# Patient Record
Sex: MALE | Race: ASIAN | NOT HISPANIC OR LATINO | URBAN - METROPOLITAN AREA
[De-identification: names, ages, dates, MRNs, and addresses within clinical notes are randomized per-mention and may not be internally consistent; named-entity substitution may affect disease eponyms.]

---

## 2021-08-31 ENCOUNTER — EMERGENCY (EMERGENCY)
Facility: HOSPITAL | Age: 41
LOS: 1 days | Discharge: HOME | End: 2021-08-31
Attending: EMERGENCY MEDICINE | Admitting: EMERGENCY MEDICINE
Payer: COMMERCIAL

## 2021-08-31 VITALS
HEART RATE: 82 BPM | OXYGEN SATURATION: 96 % | WEIGHT: 186.07 LBS | TEMPERATURE: 101 F | HEIGHT: 71 IN | DIASTOLIC BLOOD PRESSURE: 74 MMHG | SYSTOLIC BLOOD PRESSURE: 124 MMHG | RESPIRATION RATE: 16 BRPM

## 2021-08-31 VITALS
OXYGEN SATURATION: 99 % | TEMPERATURE: 99 F | SYSTOLIC BLOOD PRESSURE: 117 MMHG | HEART RATE: 72 BPM | DIASTOLIC BLOOD PRESSURE: 67 MMHG | RESPIRATION RATE: 18 BRPM

## 2021-08-31 PROCEDURE — 96374 THER/PROPH/DIAG INJ IV PUSH: CPT

## 2021-08-31 PROCEDURE — 99284 EMERGENCY DEPT VISIT MOD MDM: CPT | Mod: 25

## 2021-08-31 PROCEDURE — 96375 TX/PRO/DX INJ NEW DRUG ADDON: CPT

## 2021-08-31 PROCEDURE — 99284 EMERGENCY DEPT VISIT MOD MDM: CPT

## 2021-08-31 RX ORDER — FAMOTIDINE 10 MG/ML
20 INJECTION INTRAVENOUS ONCE
Refills: 0 | Status: COMPLETED | OUTPATIENT
Start: 2021-08-31 | End: 2021-08-31

## 2021-08-31 RX ORDER — ACETAMINOPHEN 500 MG
650 TABLET ORAL ONCE
Refills: 0 | Status: COMPLETED | OUTPATIENT
Start: 2021-08-31 | End: 2021-08-31

## 2021-08-31 RX ORDER — KETOROLAC TROMETHAMINE 30 MG/ML
30 SYRINGE (ML) INJECTION ONCE
Refills: 0 | Status: DISCONTINUED | OUTPATIENT
Start: 2021-08-31 | End: 2021-08-31

## 2021-08-31 RX ORDER — SODIUM CHLORIDE 9 MG/ML
1000 INJECTION INTRAMUSCULAR; INTRAVENOUS; SUBCUTANEOUS ONCE
Refills: 0 | Status: COMPLETED | OUTPATIENT
Start: 2021-08-31 | End: 2021-08-31

## 2021-08-31 RX ADMIN — SODIUM CHLORIDE 1000 MILLILITER(S): 9 INJECTION INTRAMUSCULAR; INTRAVENOUS; SUBCUTANEOUS at 22:31

## 2021-08-31 RX ADMIN — Medication 650 MILLIGRAM(S): at 22:31

## 2021-08-31 RX ADMIN — FAMOTIDINE 20 MILLIGRAM(S): 10 INJECTION INTRAVENOUS at 22:31

## 2021-08-31 RX ADMIN — Medication 30 MILLIGRAM(S): at 22:31

## 2021-08-31 RX ADMIN — Medication 30 MILLIGRAM(S): at 22:51

## 2021-08-31 NOTE — ED ADULT NURSE NOTE - NS ED NURSE LEVEL OF CONSCIOUSNESS AFFECT
Patients daughter Masoud Wade is requesting a call back in regards to patients insulin. She would like to know if it has been sent to the pharmacy. She can be reached at [phone]. She is on the the patients HIPAA. Calm

## 2021-08-31 NOTE — ED ADULT NURSE NOTE - OBJECTIVE STATEMENT
41 YOM A&OX3 brought in by EMS for fever and indigestion. pt states he is a  and travels frequently, tested positive for covid-19 on Friday and has had fever and indigestion since then. pt states "I threw up and have a fever". upon assessment pt is not actively vomiting. pt's oxygen saturation 96% and above on room air in ED. IV lock inserted by EMS with NS bolus initiated prior to arrival to ED. pt denies sob, chest pain, diarrhea, headaches, dizziness, blurry vision. safety maintained.

## 2021-08-31 NOTE — ED PROVIDER NOTE - OBJECTIVE STATEMENT
40yo male bib ems with nausea and vomiting, pt was diagnosed with covid 5 days ago, and has been feeling weak, fever to 103, no cough, sob, pt has no appetite and tonite got weak, no loc no other complaints

## 2021-08-31 NOTE — ED PROVIDER NOTE - PATIENT PORTAL LINK FT
You can access the FollowMyHealth Patient Portal offered by [facility] by registering at the following website: [email or web address]. By joining [facility]’s FollowMyHealth portal, you will also be able to view your health information using other applications (apps) compatible with our system.

## 2021-09-03 ENCOUNTER — INPATIENT (INPATIENT)
Facility: HOSPITAL | Age: 41
LOS: 4 days | Discharge: HOME | DRG: 871 | End: 2021-09-08
Attending: HOSPITALIST | Admitting: INTERNAL MEDICINE
Payer: COMMERCIAL

## 2021-09-03 VITALS
HEART RATE: 94 BPM | WEIGHT: 184.97 LBS | OXYGEN SATURATION: 99 % | RESPIRATION RATE: 20 BRPM | SYSTOLIC BLOOD PRESSURE: 114 MMHG | TEMPERATURE: 101 F | DIASTOLIC BLOOD PRESSURE: 68 MMHG

## 2021-09-03 DIAGNOSIS — Z29.9 ENCOUNTER FOR PROPHYLACTIC MEASURES, UNSPECIFIED: ICD-10-CM

## 2021-09-03 DIAGNOSIS — U07.1 COVID-19: ICD-10-CM

## 2021-09-03 LAB
ALBUMIN SERPL ELPH-MCNC: 2.7 G/DL — LOW (ref 3.3–5)
ALP SERPL-CCNC: 42 U/L — SIGNIFICANT CHANGE UP (ref 40–120)
ALT FLD-CCNC: 73 U/L — SIGNIFICANT CHANGE UP (ref 12–78)
ANION GAP SERPL CALC-SCNC: 8 MMOL/L — SIGNIFICANT CHANGE UP (ref 5–17)
AST SERPL-CCNC: 90 U/L — HIGH (ref 15–37)
BASOPHILS # BLD AUTO: 0 K/UL — SIGNIFICANT CHANGE UP (ref 0–0.2)
BASOPHILS NFR BLD AUTO: 0 % — SIGNIFICANT CHANGE UP (ref 0–2)
BILIRUB SERPL-MCNC: 0.8 MG/DL — SIGNIFICANT CHANGE UP (ref 0.2–1.2)
BUN SERPL-MCNC: 12 MG/DL — SIGNIFICANT CHANGE UP (ref 7–23)
CALCIUM SERPL-MCNC: 7.7 MG/DL — LOW (ref 8.5–10.1)
CHLORIDE SERPL-SCNC: 102 MMOL/L — SIGNIFICANT CHANGE UP (ref 96–108)
CO2 SERPL-SCNC: 27 MMOL/L — SIGNIFICANT CHANGE UP (ref 22–31)
CREAT SERPL-MCNC: 1 MG/DL — SIGNIFICANT CHANGE UP (ref 0.5–1.3)
CRP SERPL-MCNC: 106 MG/L — HIGH
D DIMER BLD IA.RAPID-MCNC: 420 NG/ML DDU — HIGH
EOSINOPHIL # BLD AUTO: 0 K/UL — SIGNIFICANT CHANGE UP (ref 0–0.5)
EOSINOPHIL NFR BLD AUTO: 0 % — SIGNIFICANT CHANGE UP (ref 0–6)
FERRITIN SERPL-MCNC: 3186 NG/ML — HIGH (ref 30–400)
GLUCOSE SERPL-MCNC: 99 MG/DL — SIGNIFICANT CHANGE UP (ref 70–99)
HCT VFR BLD CALC: 42 % — SIGNIFICANT CHANGE UP (ref 39–50)
HGB BLD-MCNC: 13.9 G/DL — SIGNIFICANT CHANGE UP (ref 13–17)
LACTATE SERPL-SCNC: 1.4 MMOL/L — SIGNIFICANT CHANGE UP (ref 0.7–2)
LYMPHOCYTES # BLD AUTO: 0.38 K/UL — LOW (ref 1–3.3)
LYMPHOCYTES # BLD AUTO: 5 % — LOW (ref 13–44)
MCHC RBC-ENTMCNC: 27.8 PG — SIGNIFICANT CHANGE UP (ref 27–34)
MCHC RBC-ENTMCNC: 33.1 GM/DL — SIGNIFICANT CHANGE UP (ref 32–36)
MCV RBC AUTO: 84 FL — SIGNIFICANT CHANGE UP (ref 80–100)
MONOCYTES # BLD AUTO: 0.15 K/UL — SIGNIFICANT CHANGE UP (ref 0–0.9)
MONOCYTES NFR BLD AUTO: 2 % — SIGNIFICANT CHANGE UP (ref 2–14)
NEUTROPHILS # BLD AUTO: 6.93 K/UL — SIGNIFICANT CHANGE UP (ref 1.8–7.4)
NEUTROPHILS NFR BLD AUTO: 89 % — HIGH (ref 43–77)
NRBC # BLD: SIGNIFICANT CHANGE UP /100 WBCS (ref 0–0)
PLATELET # BLD AUTO: 78 K/UL — LOW (ref 150–400)
POTASSIUM SERPL-MCNC: 4 MMOL/L — SIGNIFICANT CHANGE UP (ref 3.5–5.3)
POTASSIUM SERPL-SCNC: 4 MMOL/L — SIGNIFICANT CHANGE UP (ref 3.5–5.3)
PROCALCITONIN SERPL-MCNC: 1.93 NG/ML — HIGH (ref 0–0.04)
PROT SERPL-MCNC: 6.4 G/DL — SIGNIFICANT CHANGE UP (ref 6–8.3)
RBC # BLD: 5 M/UL — SIGNIFICANT CHANGE UP (ref 4.2–5.8)
RBC # FLD: 12.9 % — SIGNIFICANT CHANGE UP (ref 10.3–14.5)
SARS-COV-2 RNA SPEC QL NAA+PROBE: DETECTED
SODIUM SERPL-SCNC: 137 MMOL/L — SIGNIFICANT CHANGE UP (ref 135–145)
WBC # BLD: 7.53 K/UL — SIGNIFICANT CHANGE UP (ref 3.8–10.5)
WBC # FLD AUTO: 7.53 K/UL — SIGNIFICANT CHANGE UP (ref 3.8–10.5)

## 2021-09-03 PROCEDURE — 99285 EMERGENCY DEPT VISIT HI MDM: CPT

## 2021-09-03 PROCEDURE — 93010 ELECTROCARDIOGRAM REPORT: CPT

## 2021-09-03 PROCEDURE — 71045 X-RAY EXAM CHEST 1 VIEW: CPT | Mod: 26

## 2021-09-03 PROCEDURE — 99223 1ST HOSP IP/OBS HIGH 75: CPT

## 2021-09-03 PROCEDURE — 99223 1ST HOSP IP/OBS HIGH 75: CPT | Mod: GC

## 2021-09-03 RX ORDER — ACETAMINOPHEN 500 MG
650 TABLET ORAL EVERY 6 HOURS
Refills: 0 | Status: DISCONTINUED | OUTPATIENT
Start: 2021-09-03 | End: 2021-09-08

## 2021-09-03 RX ORDER — ENOXAPARIN SODIUM 100 MG/ML
40 INJECTION SUBCUTANEOUS EVERY 12 HOURS
Refills: 0 | Status: DISCONTINUED | OUTPATIENT
Start: 2021-09-03 | End: 2021-09-07

## 2021-09-03 RX ORDER — DEXAMETHASONE 0.5 MG/5ML
6 ELIXIR ORAL ONCE
Refills: 0 | Status: COMPLETED | OUTPATIENT
Start: 2021-09-03 | End: 2021-09-03

## 2021-09-03 RX ORDER — CHOLECALCIFEROL (VITAMIN D3) 125 MCG
1000 CAPSULE ORAL DAILY
Refills: 0 | Status: DISCONTINUED | OUTPATIENT
Start: 2021-09-03 | End: 2021-09-08

## 2021-09-03 RX ORDER — ENOXAPARIN SODIUM 100 MG/ML
40 INJECTION SUBCUTANEOUS DAILY
Refills: 0 | Status: DISCONTINUED | OUTPATIENT
Start: 2021-09-03 | End: 2021-09-03

## 2021-09-03 RX ORDER — GUAIFENESIN/DEXTROMETHORPHAN 600MG-30MG
5 TABLET, EXTENDED RELEASE 12 HR ORAL EVERY 6 HOURS
Refills: 0 | Status: DISCONTINUED | OUTPATIENT
Start: 2021-09-03 | End: 2021-09-08

## 2021-09-03 RX ORDER — ASPIRIN/CALCIUM CARB/MAGNESIUM 324 MG
325 TABLET ORAL ONCE
Refills: 0 | Status: COMPLETED | OUTPATIENT
Start: 2021-09-03 | End: 2021-09-03

## 2021-09-03 RX ORDER — ACETAMINOPHEN 500 MG
650 TABLET ORAL EVERY 4 HOURS
Refills: 0 | Status: DISCONTINUED | OUTPATIENT
Start: 2021-09-03 | End: 2021-09-08

## 2021-09-03 RX ORDER — REMDESIVIR 5 MG/ML
INJECTION INTRAVENOUS
Refills: 0 | Status: COMPLETED | OUTPATIENT
Start: 2021-09-03 | End: 2021-09-07

## 2021-09-03 RX ORDER — ZINC SULFATE TAB 220 MG (50 MG ZINC EQUIVALENT) 220 (50 ZN) MG
220 TAB ORAL DAILY
Refills: 0 | Status: DISCONTINUED | OUTPATIENT
Start: 2021-09-03 | End: 2021-09-08

## 2021-09-03 RX ORDER — REMDESIVIR 5 MG/ML
100 INJECTION INTRAVENOUS EVERY 24 HOURS
Refills: 0 | Status: COMPLETED | OUTPATIENT
Start: 2021-09-04 | End: 2021-09-07

## 2021-09-03 RX ORDER — DEXAMETHASONE 0.5 MG/5ML
6 ELIXIR ORAL DAILY
Refills: 0 | Status: DISCONTINUED | OUTPATIENT
Start: 2021-09-04 | End: 2021-09-08

## 2021-09-03 RX ORDER — ASCORBIC ACID 60 MG
500 TABLET,CHEWABLE ORAL DAILY
Refills: 0 | Status: DISCONTINUED | OUTPATIENT
Start: 2021-09-03 | End: 2021-09-08

## 2021-09-03 RX ORDER — ALBUTEROL 90 UG/1
2 AEROSOL, METERED ORAL EVERY 6 HOURS
Refills: 0 | Status: DISCONTINUED | OUTPATIENT
Start: 2021-09-03 | End: 2021-09-08

## 2021-09-03 RX ORDER — SODIUM CHLORIDE 9 MG/ML
1000 INJECTION INTRAMUSCULAR; INTRAVENOUS; SUBCUTANEOUS ONCE
Refills: 0 | Status: COMPLETED | OUTPATIENT
Start: 2021-09-03 | End: 2021-09-03

## 2021-09-03 RX ORDER — ACETAMINOPHEN 500 MG
650 TABLET ORAL ONCE
Refills: 0 | Status: COMPLETED | OUTPATIENT
Start: 2021-09-03 | End: 2021-09-03

## 2021-09-03 RX ORDER — REMDESIVIR 5 MG/ML
200 INJECTION INTRAVENOUS EVERY 24 HOURS
Refills: 0 | Status: COMPLETED | OUTPATIENT
Start: 2021-09-03 | End: 2021-09-03

## 2021-09-03 RX ADMIN — REMDESIVIR 500 MILLIGRAM(S): 5 INJECTION INTRAVENOUS at 11:58

## 2021-09-03 RX ADMIN — Medication 650 MILLIGRAM(S): at 11:27

## 2021-09-03 RX ADMIN — Medication 5 MILLILITER(S): at 23:50

## 2021-09-03 RX ADMIN — Medication 650 MILLIGRAM(S): at 08:51

## 2021-09-03 RX ADMIN — Medication 6 MILLIGRAM(S): at 09:10

## 2021-09-03 RX ADMIN — ALBUTEROL 2 PUFF(S): 90 AEROSOL, METERED ORAL at 18:35

## 2021-09-03 RX ADMIN — Medication 325 MILLIGRAM(S): at 09:10

## 2021-09-03 RX ADMIN — Medication 650 MILLIGRAM(S): at 18:35

## 2021-09-03 RX ADMIN — SODIUM CHLORIDE 1000 MILLILITER(S): 9 INJECTION INTRAMUSCULAR; INTRAVENOUS; SUBCUTANEOUS at 11:56

## 2021-09-03 RX ADMIN — ENOXAPARIN SODIUM 40 MILLIGRAM(S): 100 INJECTION SUBCUTANEOUS at 11:56

## 2021-09-03 RX ADMIN — Medication 5 MILLILITER(S): at 18:34

## 2021-09-03 RX ADMIN — ALBUTEROL 2 PUFF(S): 90 AEROSOL, METERED ORAL at 23:51

## 2021-09-03 NOTE — H&P ADULT - NSHPPHYSICALEXAM_GEN_ALL_CORE
VITALS:   T(C): 37.3 (09-03-21 @ 12:07), Max: 38.3 (09-03-21 @ 08:04)  HR: 76 (09-03-21 @ 12:07) (76 - 94)  BP: 110/62 (09-03-21 @ 12:07) (110/62 - 114/68)  RR: 20 (09-03-21 @ 12:07) (20 - 20)  SpO2: 99% (09-03-21 @ 12:07) (87% - 99%)    GENERAL:   HEAD:  Atraumatic, Normocephalic  EYES: EOMI, PERRLA, conjunctiva and sclera clear  ENT: Moist mucous membranes  CHEST/LUNG: decreased breath sounds bilaterally; No rales, rhonchi, wheezing, or rubs  HEART: Regular rate and rhythm; No murmurs, rubs, or gallops  ABDOMEN: BSx4; Soft, nontender, nondistended  EXTREMITIES:  2+ Peripheral Pulses. No clubbing, cyanosis, or edema  NERVOUS SYSTEM:  A&Ox3, no focal deficits   SKIN: No rashes or lesions

## 2021-09-03 NOTE — CONSULT NOTE ADULT - ASSESSMENT
pt with known Covid -   viral pna - hypoxemia - SOB - Cough - Weakness - Dec PO intake    remdesivir   decadron   o2 support  tylenol and robitussin  fio2 titration  keep sat > 88 pct  pronate as tolerated  isolation precs  serial D dimer  DVT p - Lovenox  cxr - c/w Viral PNA  consideration for CTA chest if D dimer increases on repeat testing

## 2021-09-03 NOTE — ED PROVIDER NOTE - ATTENDING CONTRIBUTION TO CARE
40 yo  male with few days of worsening cough and SOB after testing positive for COVID19 last Thursday. States O2 sat is 80s on room air. Exam revealed  male in mild distress with fine rales at bases. I agree with plan and management outlined by PA.

## 2021-09-03 NOTE — ED PROVIDER NOTE - CONSTITUTIONAL, MLM
Well appearing, awake, alert, oriented to person, place, time/situation and in no mild distress + tachypnea normal...

## 2021-09-03 NOTE — H&P ADULT - NSHPREVIEWOFSYSTEMS_GEN_ALL_CORE
General: no fever, chills, weight gain or weight loss, changes in appetite  HEENT: no nasal congestion, cough, rhinorrhea, sore throat, headache, changes in vision  Cardio: no palpitations, pallor, chest pain or discomfort  Pulm: shortness of breath  GI: no vomiting, diarrhea, abdominal pain, constipation   /Renal: no dysuria, foul smelling urine, increased frequency, flank pain  MSK: generalized myalgia   Heme: no bruising or abnormal bleeding  Skin: no rash General: admits to fever, chills, decreased appetite; no weight gain or weight loss  HEENT: admits to nasal congestion, cough, rhinorrhea, sore throat, headache  Cardio: no palpitations, pallor, chest pain or discomfort  Pulm: admits to shortness of breath  GI: admits to vomiting after every drink or meal; no diarrhea, abdominal pain, constipation   /Renal: no dysuria, foul smelling urine, increased frequency, flank pain  MSK: admits to generalized myalgia   Heme: no bruising or abnormal bleeding  Skin: no rash

## 2021-09-03 NOTE — CONSULT NOTE ADULT - SUBJECTIVE AND OBJECTIVE BOX
Date/Time Patient Seen:  		  Referring MD:   Data Reviewed	       Patient is a 41y old  Male who presents with a chief complaint of     Subjective/HPI  in bed  seen and examined  vs and meds reviewed  labs reviewed  er provider note reviewed  pt is a [employer]  - not vaccinated - sx for 1 week - sob and fever and cough  flies domestic and international      lives at home  children and wife  non smoker  non drinker    on o2 support in ED    · HPI Objective Statement: Pt is a 40 yo male with no pmhx BIBEMS for sob found to have O2 sat of 79% by ems. Pt was dx with covid about 6 days ago and sick for 7 days. Pt having fever and cough last took Tylenol and Motrin early this morning. Pt states developed shortness of breath about 2-3 days ago worse today. Pt is a  and unvaccinated. not a smoker.     	pcp Flush ing.  · Presenting Symptoms: CHEST PAIN, COUGH, DIFFICULTY BREATHING, DYSPNEA ON EXERTION, FEVER, SHORTNESS OF BREATH  · Negative Findings: no wheezing  · Associated Pain or Injury Location: chest  · Timing: gradual onset  · Duration: week(s)   · Severity: PAIN SCALE 8 OF 10.   · Context: unknown  · Recent Exposure To: none known  · Aggravated Factors: none  · Relieving Factors: none       PAST MEDICAL & SURGICAL HISTORY:        Medication list         MEDICATIONS  (STANDING):    MEDICATIONS  (PRN):         Vitals log        ICU Vital Signs Last 24 Hrs  T(C): 38.3 (03 Sep 2021 08:04), Max: 38.3 (03 Sep 2021 08:04)  T(F): 101 (03 Sep 2021 08:04), Max: 101 (03 Sep 2021 08:04)  HR: 94 (03 Sep 2021 08:04) (94 - 94)  BP: 114/68 (03 Sep 2021 08:04) (114/68 - 114/68)  BP(mean): --  ABP: --  ABP(mean): --  RR: 20 (03 Sep 2021 08:04) (20 - 20)  SpO2: 87% (03 Sep 2021 08:59) (87% - 99%)           Input and Output:  I&O's Detail      Lab Data                        13.9   7.53  )-----------( 78       ( 03 Sep 2021 09:08 )             42.0     09-03    137  |  102  |  12  ----------------------------<  99  4.0   |  27  |  1.00    Ca    7.7<L>      03 Sep 2021 09:08    TPro  6.4  /  Alb  2.7<L>  /  TBili  0.8  /  DBili  x   /  AST  90<H>  /  ALT  73  /  AlkPhos  42  09-03            Review of Systems	  sob  barlow  cough  weakness      Objective     Physical Examination    heart s1s2  lung dec BS  abd soft  head nc  head at  verbal  alert  on o2 support      Pertinent Lab findings & Imaging      Bong:  NO   Adequate UO     I&O's Detail           Discussed with:     Cultures:	        Radiology  cxr  viral pna - b/l                            
[facility]  INFECTIOUS DISEASES   =======================================================    [id number]  SON MATIAS     CC: COVID-19     HPI:  42 yo man with no significant PMH was admitted with cough, shortness of breath, fever, chills, and headache. His O2 sat was 79% and improved to 99% on NRB 15L. Now on NC o2 about 2-3L. Patient has been self-quarantined since August 25 when his symptoms first began. Patient has not had a COVID vaccine. Patient is an  for [employer] and had recent travel to [place] and [facility]. Denies any sick contacts.    PAST MEDICAL & SURGICAL HISTORY:  No pertinent past medical history  No significant past surgical history    Social Hx: no smoking or drugs, social ETOH    FAMILY HISTORY:  Family history of diabetes mellitus (DM) (Father, Mother)    Family history of hypertension (Father)    Family history of hypertension    Family history of hyperlipidemia (Father)    Allergies  No Known Allergies    Antibiotics:  remdesivir  IVPB   IV Intermittent      REVIEW OF SYSTEMS:  CONSTITUTIONAL:  No Fever or chills  HEENT:  No diplopia or blurred vision.  No sore throat or runny nose.  CARDIOVASCULAR:  No chest pain or SOB.  RESPIRATORY:  +cough, +shortness of breath  GASTROINTESTINAL:  No nausea, vomiting or diarrhea.  GENITOURINARY:  No dysuria, frequency or urgency. No Blood in urine  MUSCULOSKELETAL:  no joint aches, no muscle pain  SKIN:  No change in skin, hair or nails.  NEUROLOGIC:  No paresthesias, fasciculations, seizures or weakness.  PSYCHIATRIC:  No disorder of thought or mood.  ENDOCRINE:  No heat or cold intolerance, polyuria or polydipsia.  HEMATOLOGICAL:  No easy bruising or bleeding.     Physical Exam:  Vital Signs Last 24 Hrs  T(C): 37.3 (03 Sep 2021 12:07), Max: 38.3 (03 Sep 2021 08:04)  T(F): 99.2 (03 Sep 2021 12:07), Max: 101 (03 Sep 2021 08:04)  HR: 76 (03 Sep 2021 12:07) (76 - 94)  BP: 110/62 (03 Sep 2021 12:07) (110/62 - 114/68)  BP(mean): --  RR: 20 (03 Sep 2021 12:07) (20 - 20)  SpO2: 99% (03 Sep 2021 12:07) (87% - 99%)  Weight (kg): 83.9 (09-03 @ 08:04)  GEN: NAD  HEENT: normocephalic and atraumatic. EOMI. PERRL.    NECK: Supple.  No lymphadenopathy   LUNGS: Clear to auscultation.  HEART: Regular rate and rhythm   ABDOMEN: Soft, nontender, and nondistended.  Positive bowel sounds.    EXTREMITIES: Without edema.  NEUROLOGIC: grossly intact.  PSYCHIATRIC: Appropriate affect .  SKIN: No rash    Labs:  09-03    137  |  102  |  12  ----------------------------<  99  4.0   |  27  |  1.00    Ca    7.7<L>      03 Sep 2021 09:08    TPro  6.4  /  Alb  2.7<L>  /  TBili  0.8  /  DBili  x   /  AST  90<H>  /  ALT  73  /  AlkPhos  42  09-03                        13.9   7.53  )-----------( 78       ( 03 Sep 2021 09:08 )             42.0     LIVER FUNCTIONS - ( 03 Sep 2021 09:08 )  Alb: 2.7 g/dL / Pro: 6.4 g/dL / ALK PHOS: 42 U/L / ALT: 73 U/L / AST: 90 U/L / GGT: x           All imaging and other data have been reviewed.  < from: Xray Chest 1 View- PORTABLE-Urgent (09.03.21 @ 09:11) >  EXAM:  XR CHEST PORTABLE URGENT 1V                        PROCEDURE DATE:  09/03/2021    INTERPRETATION:  AP semierect chest on September 3, 2021 at 9:05 AM. Patient is short of breath and is positive for COVID.  COMPARISON: None available.  Heart is normal for projection.  Scattered mid lower lung field infiltrates consistent with Covid Pneumonia are noted.  IMPRESSION: Bilateral infiltrates as above.    Assessment and Plan:   42 yo man with no significant PMH was admitted with cough, shortness of breath, fever, chills, and headache. His O2 sat was 79% and improved to 99% on NRB 15L. Now on NC o2 about 2-3L. Patient has been self-quarantined since August 25 when his symptoms first began. Patient has not had a COVID vaccine.   Treatment usually is different case by case, data suggest that these might work:   Remdisivir:  5 day course , ALT < 5X ULN  Steroid: For hypoxic patients on supplemental O2 of intubated. dexamethasone 6mg PO or IV Q-day x 10 days (equivalent to solumedrol 32mg IV or Prednisone 40mg)  Anticoagulation:  with prophylactic dosing, full dose to be considered in patients with increased risk for thromboembolic complications. Bleeding can happen but acceptable in high risk patients due to hypercoagulable state.  LMWH is good, for high risk patients consider discharge on oral anticoagulation with rivaroxaban (Xarelto) 10mg PO QD or Eliquis (apixaban) 2.5-5mg PO BID  x 4 weeks.  Currently data and recommendations for COVID-19 treatment are rapidly changing, so this treatment plan is based on my clinical judgement with available information.     COVID 19   - BMP, CBC w diff, NLR. Procalcitonin, Ferritin, CRP, LDH and D dimer for the start and periodically can be repeated.   - CXR with bilateral opacities more consistent with viral pneumonia   - Start Remdesivir 200mg stat and 100mg daily for 4 more days (total 5days) or until discharge whichever comes first.   - Start Dexamethasone 6mg po daily for 10days  - Follow Creat and LFTs daily while on Remdesivir.    - Watch O2 sat closely and taper as tolerated.   - No antibiotics at this time.  - Prophylactic anticoagulation as per protocol  - Will hold on Tocilizumab for now     Thank you for courtesy of this consult.     Will follow.     Dr. Nickie Rowe   Infectious Diseases   Please call [phone] between 8am and 6pm, call [phone] after 6pm or weekends.

## 2021-09-03 NOTE — ED PROVIDER NOTE - CLINICAL SUMMARY MEDICAL DECISION MAKING FREE TEXT BOX
Pt is a 42 yo male covid + O2 sat 80's room air will get ekg labs cxr admission steroids Dr. Garcia consulted

## 2021-09-03 NOTE — ED PROVIDER NOTE - OBJECTIVE STATEMENT
Pt is a 42 yo male with no pmhx BIBEMS for sob found to have O2 sat of 79% by ems. Pt was dx with covid about 6 days ago and sick for 7 days. Pt having fever and cough last took Tylenol and Motrin early this morning. Pt states developed shortness of breath about 2-3 days ago worse today. Pt is a  and unvaccinated. not a smoker.     pcp Flush ing.

## 2021-09-03 NOTE — H&P ADULT - PROBLEM SELECTOR PLAN 1
Patient presents with fever, SOB likely 2/2 acute hypoxic respiratory failure 2/2 suspected/confirmed COVID-19 infection  - Admit to F  - Supplemental O2 prn maintain O2 sats >88%. Prone PRN  - Decadron 6mg IV x 1 given, continue for 9 additional days.  - Remdesivir 5 day course per protocol  - Will hold off on/start remdesivir, GFR >30 and ALT not > than 390, symp <14 days  - Monitor volume status closely, avoid aggressive hydration  - Tylenol prn myalgias, fever  - Avoid nebs. continue with MDI prn.  - CBC with diff and CMP QD. Ferritin, crp, d-dimer, procal at admission then will repeat If clinically worsening every 48-72 hours.  - Initiate VTE ppx unless absolute contraindication- VTE ppx: CrCl >15, BMI >30 will start loevnox 40mg SQ BID, CrCl >15, BMI <30 will start lovenox 40mg SQ QD. CrCl <15, BMI >30 will start UFH 7500u SQ q8-q12, CrCl <15 BMI <30 will start UFH 5000u SQ q8.  - Isolation precautions per protocol  - Monitor fever curve, renal function  - GOC:   - Pulm (Dr. Garcia) consulted, recommendations appreciated Patient presents with fever, SOB likely 2/2 acute hypoxic respiratory failure 2/2 suspected/confirmed COVID-19 infection  - Admit to [facility]  - Supplemental O2 prn maintain O2 sats >88%. Prone PRN  - Decadron 6mg IV x 1 given, continue for 9 additional days.  - Remdesivir 5 day course per protocol  - Duonebs q6 PRN, Robittusin q6  - Tylenol prn myalgias, fever  - CBC with diff and CMP QD. Ferritin, crp, d-dimer, procal at admission then will repeat If clinically worsening every 48-72 hours.  - Initiate VTE ppx, Loevnox 40mg SQ BID  - Isolation precautions per protocol  - Monitor fever curve, renal function  - GOC: full code  - Pulm (Dr. Garcia) consulted, recommendations appreciated  - Infectious Disease (Dr. Rowe) consulted, follow up recommendations

## 2021-09-03 NOTE — H&P ADULT - NSICDXFAMILYHX_GEN_ALL_CORE_FT
FAMILY HISTORY:  Family history of hypertension    Father  Still living? Unknown  Family history of diabetes mellitus (DM), Age at diagnosis: Age Unknown  Family history of hyperlipidemia, Age at diagnosis: Age Unknown  Family history of hypertension, Age at diagnosis: Age Unknown    Mother  Still living? Unknown  Family history of diabetes mellitus (DM), Age at diagnosis: Age Unknown

## 2021-09-03 NOTE — CONSULT NOTE ADULT - CONSULT REQUESTED BY NAME
Orthopedic and Spine Patients: Instructions on When You Can   Eat or Drink Before Surgery      You have been provided 2 pre-surgery drinks received at your pre-admission testing appointment.  Night before surgery:  o You should drink one pre-surgery drink at bedtime. No food after midnight!  Day of Surgery:  o Complete 2nd pre-surgery drink 1 hour prior to arrival at hospital.  For questions call Pre-Admission Testing at [phone]. They are available from 8:00am-5:00pm, Monday through Friday.
Dr. Melo
MD

## 2021-09-03 NOTE — H&P ADULT - NSHPSOCIALHISTORY_GEN_ALL_CORE
Tobacco: never  EtOH: 1 can beer every day  Recreational drug use: denies  Lives with: wife and son and daughter  Ambulates: independent   ADLs: independent  Occupation:  for [employer]  Vaccinations: no COVID shot Tobacco: never  EtOH: 1 can of beer every day  Recreational drug use: denies  Lives with: wife, son, daughter  Ambulates: independent   ADLs: independent  Occupation:  for [employer]  Vaccinations: no COVID vaccine

## 2021-09-03 NOTE — ED ADULT NURSE NOTE - OBJECTIVE STATEMENT
Received pt alert and orientated. Pt is having SOB pt is positive covid since Friday. On RA pt destat in the 80's. Pt is on NC doing well. Pt denies abd pain and chest pain. Call bell within reach. Freq rounding performed. Safety/Comfort maintained. Will continue to monitor.

## 2021-09-03 NOTE — ED ADULT TRIAGE NOTE - CHIEF COMPLAINT QUOTE
Pt p/w shortness of breath worsening x 5 days. Diagnosed covid + per EMS pt w/ sat in high 70s on room air w/ relief to 100% on non rebreather

## 2021-09-03 NOTE — H&P ADULT - HISTORY OF PRESENT ILLNESS
The date the pt first felt unwell: Aug 25  Fever or chills: yes [ x ]   no [  ]   - Tmax: 103  Fatigue, malaise or generalized weakness: yes [ x ]   no [  ]  Shortness of breath/dyspnea: yes [ x ]   no [  ]  Cough: yes [ x ]   no [  ], sputum production: yes [ x ]   no [  ]  Blood in sputum: yes [  ]   no [ x ]  Anorexia/po intolerance: yes [ x ]   no [  ]  Chest pain or chest tightness: yes [  ]   no [ x ]  Edema in legs: yes [  ]   no [ x ]  Myalgias: yes [ x ]   no [  ]  Headache: yes [ x ]   no [  ]  Sore throat: yes [ x ]   no [  ]  Rhinorrhea: yes [ x ]   no [  ]  Abd pain: yes [ x ]   no [  ]  Nausea: yes [ x ]   no [  ]  Vomiting: yes [ x ]   no [  ] ; 2x a day since aug 25  Diarrhea: yes [ x ]   no [  ]  Skin rashes: yes [  ]   no [ x ]  Loss of sense of smell/anosmia: yes [  ]   no [ x ]  Conjunctivitis: yes [ x ]   no [  ]  Recent travel: yes [ x ]   no [  ] - Location: [place], [place] (he's a )  Any sick contacts: yes [  ]   no [ x ]  Close contact with someone confirmed positive with COVID-19 / SARS-CoV2 in the last 14 days: yes [  ]   no [ x ]  Code status: full code    Other pertinent history:     ED course:   41 year old M w no PMH BIBEMS sating 79% on RA and c/o shortness of breath, fever, chills, cough, headache. Patient has been self-quarantined since August 25 when his symptoms first began. Patient denies history of smoking and has not had a COVID vaccine. Patient is an  for [employer] and had recent travel to [place] and [employer] [facility]. Denies any sick contacts.    The date the pt first felt unwell: Aug 25  Fever or chills: yes [ x ]   no [  ]   - Tmax: 103  Fatigue, malaise or generalized weakness: yes [ x ]   no [  ]  Shortness of breath/dyspnea: yes [ x ]   no [  ]  Cough: yes [ x ]   no [  ], sputum production: yes [ x ]   no [  ]  Blood in sputum: yes [  ]   no [ x ]  Anorexia/po intolerance: yes [ x ]   no [  ]  Chest pain or chest tightness: yes [  ]   no [ x ]  Edema in legs: yes [  ]   no [ x ]  Myalgias: yes [ x ]   no [  ]  Headache: yes [ x ]   no [  ]  Sore throat: yes [ x ]   no [  ]  Rhinorrhea: yes [ x ]   no [  ]  Abd pain: yes [ x ]   no [  ]  Nausea: yes [ x ]   no [  ]  Vomiting: yes [ x ]   no [  ] ;   Diarrhea: yes [ x ]   no [  ]  Skin rashes: yes [  ]   no [ x ]  Loss of sense of smell/anosmia: yes [  ]   no [ x ]  Conjunctivitis: yes [ x ]   no [  ]  Recent travel: yes [ x ]   no [  ] - Location: [place], [place]  Any sick contacts: yes [  ]   no [ x ]  Close contact with someone confirmed positive with COVID-19 / SARS-CoV2 in the last 14 days: yes [  ]   no [ x ]  Code status: full code    ED course:  Vitals: T 101F, HR 94, /68, RR 20, SpO2 99% NRB 15L  Labs: platelets 78, lymphocytes 0.38, D-dimer 420, , Ferritin 3186, Ca 7.7, Albumin 2.7, AST 90, Procal 1.93  EKG: NSR 86bpm, Nonspecific T wave abnormality  CXR: Scattered mid lower lung field infiltrates consistent with Covid Pneumonia  Given: Tylenol 650mg x1, Aspirin 325mg x1, Decadron 6mg IV x1, Lovenox 40mg subQ x1, Remdesivir 200mg IVPB x1, 1L IV NS

## 2021-09-04 LAB
A1C WITH ESTIMATED AVERAGE GLUCOSE RESULT: 6 % — HIGH (ref 4–5.6)
ALBUMIN SERPL ELPH-MCNC: 2.3 G/DL — LOW (ref 3.3–5)
ALP SERPL-CCNC: 50 U/L — SIGNIFICANT CHANGE UP (ref 40–120)
ALT FLD-CCNC: 85 U/L — HIGH (ref 12–78)
ANION GAP SERPL CALC-SCNC: 8 MMOL/L — SIGNIFICANT CHANGE UP (ref 5–17)
AST SERPL-CCNC: 80 U/L — HIGH (ref 15–37)
BASOPHILS # BLD AUTO: 0.01 K/UL — SIGNIFICANT CHANGE UP (ref 0–0.2)
BASOPHILS NFR BLD AUTO: 0.2 % — SIGNIFICANT CHANGE UP (ref 0–2)
BILIRUB SERPL-MCNC: 0.5 MG/DL — SIGNIFICANT CHANGE UP (ref 0.2–1.2)
BUN SERPL-MCNC: 18 MG/DL — SIGNIFICANT CHANGE UP (ref 7–23)
CALCIUM SERPL-MCNC: 7.5 MG/DL — LOW (ref 8.5–10.1)
CHLORIDE SERPL-SCNC: 106 MMOL/L — SIGNIFICANT CHANGE UP (ref 96–108)
CO2 SERPL-SCNC: 26 MMOL/L — SIGNIFICANT CHANGE UP (ref 22–31)
COVID-19 SPIKE DOMAIN AB INTERP: NEGATIVE — SIGNIFICANT CHANGE UP
COVID-19 SPIKE DOMAIN ANTIBODY RESULT: 0.4 U/ML — SIGNIFICANT CHANGE UP
CREAT SERPL-MCNC: 0.71 MG/DL — SIGNIFICANT CHANGE UP (ref 0.5–1.3)
EOSINOPHIL # BLD AUTO: 0 K/UL — SIGNIFICANT CHANGE UP (ref 0–0.5)
EOSINOPHIL NFR BLD AUTO: 0 % — SIGNIFICANT CHANGE UP (ref 0–6)
ESTIMATED AVERAGE GLUCOSE: 126 MG/DL — HIGH (ref 68–114)
GLUCOSE SERPL-MCNC: 138 MG/DL — HIGH (ref 70–99)
HCT VFR BLD CALC: 40.6 % — SIGNIFICANT CHANGE UP (ref 39–50)
HGB BLD-MCNC: 13.3 G/DL — SIGNIFICANT CHANGE UP (ref 13–17)
IMM GRANULOCYTES NFR BLD AUTO: 1 % — SIGNIFICANT CHANGE UP (ref 0–1.5)
LYMPHOCYTES # BLD AUTO: 0.67 K/UL — LOW (ref 1–3.3)
LYMPHOCYTES # BLD AUTO: 10.7 % — LOW (ref 13–44)
MCHC RBC-ENTMCNC: 27.1 PG — SIGNIFICANT CHANGE UP (ref 27–34)
MCHC RBC-ENTMCNC: 32.8 GM/DL — SIGNIFICANT CHANGE UP (ref 32–36)
MCV RBC AUTO: 82.9 FL — SIGNIFICANT CHANGE UP (ref 80–100)
MONOCYTES # BLD AUTO: 0.29 K/UL — SIGNIFICANT CHANGE UP (ref 0–0.9)
MONOCYTES NFR BLD AUTO: 4.6 % — SIGNIFICANT CHANGE UP (ref 2–14)
NEUTROPHILS # BLD AUTO: 5.23 K/UL — SIGNIFICANT CHANGE UP (ref 1.8–7.4)
NEUTROPHILS NFR BLD AUTO: 83.5 % — HIGH (ref 43–77)
NRBC # BLD: 0 /100 WBCS — SIGNIFICANT CHANGE UP (ref 0–0)
PLATELET # BLD AUTO: 102 K/UL — LOW (ref 150–400)
POTASSIUM SERPL-MCNC: 4.4 MMOL/L — SIGNIFICANT CHANGE UP (ref 3.5–5.3)
POTASSIUM SERPL-SCNC: 4.4 MMOL/L — SIGNIFICANT CHANGE UP (ref 3.5–5.3)
PROT SERPL-MCNC: 6 G/DL — SIGNIFICANT CHANGE UP (ref 6–8.3)
RBC # BLD: 4.9 M/UL — SIGNIFICANT CHANGE UP (ref 4.2–5.8)
RBC # FLD: 12.9 % — SIGNIFICANT CHANGE UP (ref 10.3–14.5)
SARS-COV-2 IGG+IGM SERPL QL IA: 0.4 U/ML — SIGNIFICANT CHANGE UP
SARS-COV-2 IGG+IGM SERPL QL IA: NEGATIVE — SIGNIFICANT CHANGE UP
SODIUM SERPL-SCNC: 140 MMOL/L — SIGNIFICANT CHANGE UP (ref 135–145)
WBC # BLD: 6.26 K/UL — SIGNIFICANT CHANGE UP (ref 3.8–10.5)
WBC # FLD AUTO: 6.26 K/UL — SIGNIFICANT CHANGE UP (ref 3.8–10.5)

## 2021-09-04 PROCEDURE — 99232 SBSQ HOSP IP/OBS MODERATE 35: CPT

## 2021-09-04 PROCEDURE — 99232 SBSQ HOSP IP/OBS MODERATE 35: CPT | Mod: GC

## 2021-09-04 RX ORDER — SIMETHICONE 80 MG/1
80 TABLET, CHEWABLE ORAL ONCE
Refills: 0 | Status: COMPLETED | OUTPATIENT
Start: 2021-09-04 | End: 2021-09-04

## 2021-09-04 RX ORDER — SENNA PLUS 8.6 MG/1
2 TABLET ORAL AT BEDTIME
Refills: 0 | Status: DISCONTINUED | OUTPATIENT
Start: 2021-09-04 | End: 2021-09-08

## 2021-09-04 RX ORDER — POLYETHYLENE GLYCOL 3350 17 G/17G
17 POWDER, FOR SOLUTION ORAL DAILY
Refills: 0 | Status: DISCONTINUED | OUTPATIENT
Start: 2021-09-04 | End: 2021-09-08

## 2021-09-04 RX ADMIN — Medication 1 TABLET(S): at 11:32

## 2021-09-04 RX ADMIN — ENOXAPARIN SODIUM 40 MILLIGRAM(S): 100 INJECTION SUBCUTANEOUS at 17:25

## 2021-09-04 RX ADMIN — Medication 5 MILLILITER(S): at 21:37

## 2021-09-04 RX ADMIN — ENOXAPARIN SODIUM 40 MILLIGRAM(S): 100 INJECTION SUBCUTANEOUS at 05:44

## 2021-09-04 RX ADMIN — Medication 5 MILLILITER(S): at 11:33

## 2021-09-04 RX ADMIN — Medication 500 MILLIGRAM(S): at 11:32

## 2021-09-04 RX ADMIN — ZINC SULFATE TAB 220 MG (50 MG ZINC EQUIVALENT) 220 MILLIGRAM(S): 220 (50 ZN) TAB at 11:32

## 2021-09-04 RX ADMIN — ALBUTEROL 2 PUFF(S): 90 AEROSOL, METERED ORAL at 05:44

## 2021-09-04 RX ADMIN — Medication 650 MILLIGRAM(S): at 04:52

## 2021-09-04 RX ADMIN — Medication 5 MILLILITER(S): at 17:25

## 2021-09-04 RX ADMIN — Medication 650 MILLIGRAM(S): at 05:15

## 2021-09-04 RX ADMIN — Medication 6 MILLIGRAM(S): at 05:44

## 2021-09-04 RX ADMIN — Medication 5 MILLILITER(S): at 05:44

## 2021-09-04 RX ADMIN — REMDESIVIR 500 MILLIGRAM(S): 5 INJECTION INTRAVENOUS at 11:33

## 2021-09-04 RX ADMIN — SIMETHICONE 80 MILLIGRAM(S): 80 TABLET, CHEWABLE ORAL at 04:52

## 2021-09-04 RX ADMIN — Medication 1000 UNIT(S): at 11:33

## 2021-09-04 NOTE — PROGRESS NOTE ADULT - SUBJECTIVE AND OBJECTIVE BOX
Date/Time Patient Seen:  		  Referring MD:   Data Reviewed	       Patient is a 41y old  Male who presents with a chief complaint of COVID-19 (03 Sep 2021 17:20)      Subjective/HPI     PAST MEDICAL & SURGICAL HISTORY:  No pertinent past medical history    No significant past surgical history          Medication list         MEDICATIONS  (STANDING):  ALBUTerol    90 MICROgram(s) HFA Inhaler 2 Puff(s) Inhalation every 6 hours  ascorbic acid 500 milliGRAM(s) Oral daily  cholecalciferol 1000 Unit(s) Oral daily  dexAMETHasone     Tablet 6 milliGRAM(s) Oral daily  enoxaparin Injectable 40 milliGRAM(s) SubCutaneous every 12 hours  guaifenesin/dextromethorphan Oral Liquid 5 milliLiter(s) Oral every 6 hours  multivitamin 1 Tablet(s) Oral daily  remdesivir  IVPB 100 milliGRAM(s) IV Intermittent every 24 hours  remdesivir  IVPB   IV Intermittent   zinc sulfate 220 milliGRAM(s) Oral daily    MEDICATIONS  (PRN):  acetaminophen   Tablet .. 650 milliGRAM(s) Oral every 6 hours PRN Temp greater or equal to 38C (100.4F), Mild Pain (1 - 3)  acetaminophen   Tablet .. 650 milliGRAM(s) Oral every 4 hours PRN Temp greater or equal to 38C (100.4F)  acetaminophen  Suppository .. 650 milliGRAM(s) Rectal every 4 hours PRN Temp greater or equal to 38C (100.4F)  benzonatate 100 milliGRAM(s) Oral every 8 hours PRN Cough  polyethylene glycol 3350 17 Gram(s) Oral daily PRN Constipation  senna 2 Tablet(s) Oral at bedtime PRN Constipation         Vitals log        ICU Vital Signs Last 24 Hrs  T(C): 37.1 (04 Sep 2021 04:47), Max: 38.3 (03 Sep 2021 08:04)  T(F): 98.7 (04 Sep 2021 04:47), Max: 101 (03 Sep 2021 08:04)  HR: 71 (04 Sep 2021 04:47) (71 - 94)  BP: 111/69 (04 Sep 2021 04:47) (106/69 - 114/68)  BP(mean): --  ABP: --  ABP(mean): --  RR: 18 (04 Sep 2021 04:47) (18 - 20)  SpO2: 93% (04 Sep 2021 04:47) (87% - 99%)           Input and Output:  I&O's Detail      Lab Data                        13.9   7.53  )-----------( 78       ( 03 Sep 2021 09:08 )             42.0     09-03    137  |  102  |  12  ----------------------------<  99  4.0   |  27  |  1.00    Ca    7.7<L>      03 Sep 2021 09:08    TPro  6.4  /  Alb  2.7<L>  /  TBili  0.8  /  DBili  x   /  AST  90<H>  /  ALT  73  /  AlkPhos  42  09-03            Review of Systems	      Objective     Physical Examination    heart s1s2  lung dec BS  abd soft      Pertinent Lab findings & Imaging      Bong:  NO   Adequate UO     I&O's Detail           Discussed with:     Cultures:	        Radiology

## 2021-09-04 NOTE — PROGRESS NOTE ADULT - SUBJECTIVE AND OBJECTIVE BOX
[facility]  INFECTIOUS DISEASES   =======================================================    [facility]-[id number]  SON MATIAS     Follow up: COVID-19     O2 requirement higher, 4L now but still NC.  No GI symptoms. No fever.     PAST MEDICAL & SURGICAL HISTORY:  No pertinent past medical history  No significant past surgical history    Social Hx: no smoking or drugs, social ETOH    FAMILY HISTORY:  Family history of diabetes mellitus (DM) (Father, Mother)    Family history of hypertension (Father)    Family history of hypertension    Family history of hyperlipidemia (Father)    Allergies  No Known Allergies    Antibiotics:  remdesivir  IVPB   IV Intermittent      REVIEW OF SYSTEMS:  CONSTITUTIONAL:  No Fever or chills  HEENT:  No diplopia or blurred vision.  No sore throat or runny nose.  CARDIOVASCULAR:  No chest pain or SOB.  RESPIRATORY:  +cough, +shortness of breath  GASTROINTESTINAL:  No nausea, vomiting or diarrhea.  GENITOURINARY:  No dysuria, frequency or urgency. No Blood in urine  MUSCULOSKELETAL:  no joint aches, no muscle pain  SKIN:  No change in skin, hair or nails.  NEUROLOGIC:  No paresthesias, fasciculations, seizures or weakness.  PSYCHIATRIC:  No disorder of thought or mood.  ENDOCRINE:  No heat or cold intolerance, polyuria or polydipsia.  HEMATOLOGICAL:  No easy bruising or bleeding.     Physical Exam:  Vital Signs Last 24 Hrs  T(C): 36.8 (04 Sep 2021 13:04), Max: 37.1 (04 Sep 2021 04:47)  T(F): 98.2 (04 Sep 2021 13:04), Max: 98.7 (04 Sep 2021 04:47)  HR: 78 (04 Sep 2021 13:04) (71 - 82)  BP: 121/76 (04 Sep 2021 13:04) (106/69 - 121/76)  BP(mean): --  RR: 17 (04 Sep 2021 13:04) (17 - 18)  SpO2: 92% (04 Sep 2021 13:04) (92% - 93%)  GEN: NAD  HEENT: normocephalic and atraumatic. EOMI. PERRL.    NECK: Supple.  No lymphadenopathy   LUNGS: Clear to auscultation.  HEART: Regular rate and rhythm   ABDOMEN: Soft, nontender, and nondistended.  Positive bowel sounds.    EXTREMITIES: Without edema.  NEUROLOGIC: grossly intact.  PSYCHIATRIC: Appropriate affect .  SKIN: No rash    Labs:                        13.3   6.26  )-----------( 102      ( 04 Sep 2021 07:22 )             40.6     09-04    140  |  106  |  18  ----------------------------<  138<H>  4.4   |  26  |  0.71    Ca    7.5<L>      04 Sep 2021 07:22    TPro  6.0  /  Alb  2.3<L>  /  TBili  0.5  /  DBili  x   /  AST  80<H>  /  ALT  85<H>  /  AlkPhos  50  09-04    Culture - Blood (collected 09-03-21 @ 11:59)  Source: .Blood Blood    Culture - Blood (collected 09-03-21 @ 11:59)  Source: .Blood Blood    WBC Count: 6.26 K/uL (09-04-21 @ 07:22)  WBC Count: 7.53 K/uL (09-03-21 @ 09:08)    Creatinine, Serum: 0.71 mg/dL (09-04-21 @ 07:22)  Creatinine, Serum: 1.00 mg/dL (09-03-21 @ 09:08)    C-Reactive Protein, Serum: 106 mg/L (09-03-21 @ 11:17)    Ferritin, Serum: 3186 ng/mL (09-03-21 @ 11:17)    Procalcitonin, Serum: 1.93 ng/mL (09-03-21 @ 09:08)     COVID-19 PCR: Detected (09-03-21 @ 09:08)    All imaging and other data have been reviewed.  < from: Xray Chest 1 View- PORTABLE-Urgent (09.03.21 @ 09:11) >  EXAM:  XR CHEST PORTABLE URGENT 1V                        PROCEDURE DATE:  09/03/2021    INTERPRETATION:  AP semierect chest on September 3, 2021 at 9:05 AM. Patient is short of breath and is positive for COVID.  COMPARISON: None available.  Heart is normal for projection.  Scattered mid lower lung field infiltrates consistent with Covid Pneumonia are noted.  IMPRESSION: Bilateral infiltrates as above.    Assessment and Plan:   40 yo man with no significant PMH was admitted with cough, shortness of breath, fever, chills, and headache. His O2 sat was 79% and improved to 99% on NRB 15L. Now on NC o2 about 2-3L. Patient has been self-quarantined since August 25 when his symptoms first began. Patient has not had a COVID vaccine.   Treatment usually is different case by case, data suggest that these might work:   Remdisivir:  5 day course , ALT < 5X ULN  Steroid: For hypoxic patients on supplemental O2 of intubated. dexamethasone 6mg PO or IV Q-day x 10 days (equivalent to solumedrol 32mg IV or Prednisone 40mg)  Anticoagulation:  with prophylactic dosing, full dose to be considered in patients with increased risk for thromboembolic complications. Bleeding can happen but acceptable in high risk patients due to hypercoagulable state.  LMWH is good, for high risk patients consider discharge on oral anticoagulation with rivaroxaban (Xarelto) 10mg PO QD or Eliquis (apixaban) 2.5-5mg PO BID  x 4 weeks.  Currently data and recommendations for COVID-19 treatment are rapidly changing, so this treatment plan is based on my clinical judgement with available information.     COVID 19   - BMP, CBC w diff, NLR. Procalcitonin, Ferritin, CRP, LDH and D dimer for the start and periodically can be repeated.   - CXR with bilateral opacities more consistent with viral pneumonia   - Continue Remdesivir for total 5days  - Continue Dexamethasone 6mg po daily for 10days  - Follow Creat and LFTs daily while on Remdesivir.    - Watch O2 sat closely and taper as tolerated.   - No antibiotics at this time.  - Prophylactic anticoagulation as per protocol  - Will hold on Tocilizumab for now  - Will watch Procalcitonin slightly high     Will follow.     Dr. Nickie Rowe   Infectious Diseases   Please call [phone] between 8am and 6pm, call [phone] after 6pm or weekends.

## 2021-09-04 NOTE — PROGRESS NOTE ADULT - SUBJECTIVE AND OBJECTIVE BOX
Patient is a 41y old  Male who presents with a chief complaint of COVID-19 (04 Sep 2021 05:57)        HPI:  41 year old M w no PMH BIBEMS sating 79% on RA and c/o shortness of breath, fever, chills, cough, headache. Patient has been self-quarantined since August 25 when his symptoms first began. Patient denies history of smoking and has not had a COVID vaccine. Patient is an  for [employer] and had recent travel to [place] and [facility]. Denies any sick contacts.    The date the pt first felt unwell: Aug 25  Fever or chills: yes [ x ]   no [  ]   - Tmax: 103  Fatigue, malaise or generalized weakness: yes [ x ]   no [  ]  Shortness of breath/dyspnea: yes [ x ]   no [  ]  Cough: yes [ x ]   no [  ], sputum production: yes [ x ]   no [  ]  Blood in sputum: yes [  ]   no [ x ]  Anorexia/po intolerance: yes [ x ]   no [  ]  Chest pain or chest tightness: yes [  ]   no [ x ]  Edema in legs: yes [  ]   no [ x ]  Myalgias: yes [ x ]   no [  ]  Headache: yes [ x ]   no [  ]  Sore throat: yes [ x ]   no [  ]  Rhinorrhea: yes [ x ]   no [  ]  Abd pain: yes [ x ]   no [  ]  Nausea: yes [ x ]   no [  ]  Vomiting: yes [ x ]   no [  ] ;   Diarrhea: yes [ x ]   no [  ]  Skin rashes: yes [  ]   no [ x ]  Loss of sense of smell/anosmia: yes [  ]   no [ x ]  Conjunctivitis: yes [ x ]   no [  ]  Recent travel: yes [ x ]   no [  ] - Location: [place], [place]  Any sick contacts: yes [  ]   no [ x ]  Close contact with someone confirmed positive with COVID-19 / SARS-CoV2 in the last 14 days: yes [  ]   no [ x ]  Code status: full code    ED course:  Vitals: T 101F, HR 94, /68, RR 20, SpO2 99% NRB 15L  Labs: platelets 78, lymphocytes 0.38, D-dimer 420, , Ferritin 3186, Ca 7.7, Albumin 2.7, AST 90, Procal 1.93  EKG: NSR 86bpm, Nonspecific T wave abnormality  CXR: Scattered mid lower lung field infiltrates consistent with Covid Pneumonia  Given: Tylenol 650mg x1, Aspirin 325mg x1, Decadron 6mg IV x1, Lovenox 40mg subQ x1, Remdesivir 200mg IVPB x1, 1L IV NS (03 Sep 2021 13:52)      SUBJECTIVE & OBJECTIVE: Pt seen and examined at bedside.     PHYSICAL EXAM:  T(C): 37.1 (09-04-21 @ 04:47), Max: 37.3 (09-03-21 @ 12:07)  HR: 71 (09-04-21 @ 04:47) (71 - 82)  BP: 111/69 (09-04-21 @ 04:47) (106/69 - 111/69)  RR: 18 (09-04-21 @ 04:47) (18 - 20)  SpO2: 93% (09-04-21 @ 04:47) (92% - 99%)  Wt(kg): --   GENERAL: NAD, well-groomed, well-developed  NECK: Supple, No JVD  NERVOUS SYSTEM:  Alert   CHEST/LUNG: Clear to auscultation bilaterally; No rales, rhonchi, wheezing, or rubs  HEART: Regular rate and rhythm; No murmurs, rubs, or gallops  ABDOMEN: Soft, Nontender, Nondistended; Bowel sounds present  EXTREMITIES:  2+ Peripheral Pulses, No clubbing, cyanosis, or edema        MEDICATIONS  (STANDING):  ALBUTerol    90 MICROgram(s) HFA Inhaler 2 Puff(s) Inhalation every 6 hours  ascorbic acid 500 milliGRAM(s) Oral daily  cholecalciferol 1000 Unit(s) Oral daily  dexAMETHasone     Tablet 6 milliGRAM(s) Oral daily  enoxaparin Injectable 40 milliGRAM(s) SubCutaneous every 12 hours  guaifenesin/dextromethorphan Oral Liquid 5 milliLiter(s) Oral every 6 hours  multivitamin 1 Tablet(s) Oral daily  remdesivir  IVPB 100 milliGRAM(s) IV Intermittent every 24 hours  remdesivir  IVPB   IV Intermittent   zinc sulfate 220 milliGRAM(s) Oral daily    MEDICATIONS  (PRN):  acetaminophen   Tablet .. 650 milliGRAM(s) Oral every 6 hours PRN Temp greater or equal to 38C (100.4F), Mild Pain (1 - 3)  acetaminophen   Tablet .. 650 milliGRAM(s) Oral every 4 hours PRN Temp greater or equal to 38C (100.4F)  acetaminophen  Suppository .. 650 milliGRAM(s) Rectal every 4 hours PRN Temp greater or equal to 38C (100.4F)  benzonatate 100 milliGRAM(s) Oral every 8 hours PRN Cough  polyethylene glycol 3350 17 Gram(s) Oral daily PRN Constipation  senna 2 Tablet(s) Oral at bedtime PRN Constipation      LABS:                        13.3   6.26  )-----------( 102      ( 04 Sep 2021 07:22 )             40.6     09-04    140  |  106  |  18  ----------------------------<  138<H>  4.4   |  26  |  0.71    Ca    7.5<L>      04 Sep 2021 07:22    TPro  6.0  /  Alb  2.3<L>  /  TBili  0.5  /  DBili  x   /  AST  80<H>  /  ALT  85<H>  /  AlkPhos  50  09-04          CAPILLARY BLOOD GLUCOSE          CAPILLARY BLOOD GLUCOSE        CAPILLARY BLOOD GLUCOSE                RECENT CULTURES:      RADIOLOGY & ADDITIONAL TESTS:                        DVT/GI ppx  Discussed with pt @ bedside

## 2021-09-04 NOTE — PROGRESS NOTE ADULT - PROBLEM SELECTOR PLAN 1
Patient presents with fever, SOB likely 2/2 acute hypoxic respiratory failure 2/2 suspected/confirmed COVID-19 infection  - Supplemental O2 prn maintain O2 sats >88%. Prone PRN  - Decadron 6mg IV x 1 given, continue for 9 additional days.  - Remdesivir 5 day course per protocol  - Duonebs q6 PRN, Robittusin q6  - Tylenol prn myalgias, fever  - CBC with diff and CMP QD. Ferritin, crp, d-dimer, procal at admission then will repeat If clinically worsening every 48-72 hours.  - Initiate VTE ppx, Loevnox 40mg SQ BID  - Isolation precautions per protocol  - Monitor fever curve, renal function  - GOC: full code  - Pulm (Dr. Garcia) consulted, recommendations appreciated  - Infectious Disease (Dr. Rowe) consulted, follow up recommendations

## 2021-09-05 PROCEDURE — 99232 SBSQ HOSP IP/OBS MODERATE 35: CPT

## 2021-09-05 PROCEDURE — 99233 SBSQ HOSP IP/OBS HIGH 50: CPT

## 2021-09-05 RX ADMIN — Medication 6 MILLIGRAM(S): at 05:34

## 2021-09-05 RX ADMIN — REMDESIVIR 500 MILLIGRAM(S): 5 INJECTION INTRAVENOUS at 12:47

## 2021-09-05 RX ADMIN — Medication 5 MILLILITER(S): at 05:34

## 2021-09-05 RX ADMIN — ENOXAPARIN SODIUM 40 MILLIGRAM(S): 100 INJECTION SUBCUTANEOUS at 05:34

## 2021-09-05 RX ADMIN — ZINC SULFATE TAB 220 MG (50 MG ZINC EQUIVALENT) 220 MILLIGRAM(S): 220 (50 ZN) TAB at 12:47

## 2021-09-05 RX ADMIN — Medication 5 MILLILITER(S): at 12:46

## 2021-09-05 RX ADMIN — Medication 1000 UNIT(S): at 12:47

## 2021-09-05 RX ADMIN — ALBUTEROL 2 PUFF(S): 90 AEROSOL, METERED ORAL at 12:48

## 2021-09-05 RX ADMIN — Medication 100 MILLIGRAM(S): at 08:51

## 2021-09-05 RX ADMIN — ENOXAPARIN SODIUM 40 MILLIGRAM(S): 100 INJECTION SUBCUTANEOUS at 18:42

## 2021-09-05 RX ADMIN — Medication 500 MILLIGRAM(S): at 12:47

## 2021-09-05 RX ADMIN — Medication 5 MILLILITER(S): at 18:42

## 2021-09-05 RX ADMIN — Medication 1 TABLET(S): at 12:47

## 2021-09-05 NOTE — PROGRESS NOTE ADULT - PROBLEM SELECTOR PLAN 1
-  acute hypoxic respiratory failure due to COVID-19 infection with associated PNA  - continue Supplemental Oxygen as needed and titrate to maintain O2 sats >88%. Prone as tolerated  - continue Decadron #  - Remdesivir #  - continue Robitussin for cough  - Tylenol prn myalgias, fever  - Loevnox for DVT prophylaxis  - Isolation precautions per COVID-19 infection control protocol  - Monitor fever curve,   - Monitor renal function  - GOC: full code  - Pulm and ID follow up ongoing -  acute hypoxic respiratory failure due to COVID-19 infection with associated PNA  - continue Supplemental Oxygen as needed and titrate to maintain O2 sats >88%. Prone as tolerated  - continue Decadron #3 of 10  - Remdesivir # 3 of 5  - continue Robitussin for cough  - Tylenol prn myalgias, fever  - Lovenox for DVT prophylaxis  - Isolation precautions per COVID-19 infection control protocol  - Monitor renal function  - GOC: full code  - Pulm and ID follow up ongoing  - Low threshold for ICU consult of deteriorates clinically  - Prognosis is guarded

## 2021-09-05 NOTE — PROGRESS NOTE ADULT - SUBJECTIVE AND OBJECTIVE BOX
Date/Time Patient Seen:  		  Referring MD:   Data Reviewed	       Patient is a 41y old  Male who presents with a chief complaint of COVID-19 (04 Sep 2021 18:49)      Subjective/HPI     PAST MEDICAL & SURGICAL HISTORY:  No pertinent past medical history    No significant past surgical history          Medication list         MEDICATIONS  (STANDING):  ALBUTerol    90 MICROgram(s) HFA Inhaler 2 Puff(s) Inhalation every 6 hours  ascorbic acid 500 milliGRAM(s) Oral daily  cholecalciferol 1000 Unit(s) Oral daily  dexAMETHasone     Tablet 6 milliGRAM(s) Oral daily  enoxaparin Injectable 40 milliGRAM(s) SubCutaneous every 12 hours  guaifenesin/dextromethorphan Oral Liquid 5 milliLiter(s) Oral every 6 hours  multivitamin 1 Tablet(s) Oral daily  remdesivir  IVPB 100 milliGRAM(s) IV Intermittent every 24 hours  remdesivir  IVPB   IV Intermittent   zinc sulfate 220 milliGRAM(s) Oral daily    MEDICATIONS  (PRN):  acetaminophen   Tablet .. 650 milliGRAM(s) Oral every 6 hours PRN Temp greater or equal to 38C (100.4F), Mild Pain (1 - 3)  acetaminophen   Tablet .. 650 milliGRAM(s) Oral every 4 hours PRN Temp greater or equal to 38C (100.4F)  acetaminophen  Suppository .. 650 milliGRAM(s) Rectal every 4 hours PRN Temp greater or equal to 38C (100.4F)  benzonatate 100 milliGRAM(s) Oral every 8 hours PRN Cough  polyethylene glycol 3350 17 Gram(s) Oral daily PRN Constipation  senna 2 Tablet(s) Oral at bedtime PRN Constipation         Vitals log        ICU Vital Signs Last 24 Hrs  T(C): 36.6 (05 Sep 2021 05:19), Max: 36.8 (04 Sep 2021 13:04)  T(F): 97.8 (05 Sep 2021 05:19), Max: 98.3 (04 Sep 2021 20:13)  HR: 69 (05 Sep 2021 05:19) (63 - 78)  BP: 114/75 (05 Sep 2021 05:19) (113/72 - 121/76)  BP(mean): --  ABP: --  ABP(mean): --  RR: 18 (05 Sep 2021 05:19) (17 - 18)  SpO2: 92% (05 Sep 2021 05:19) (92% - 94%)           Input and Output:  I&O's Detail    04 Sep 2021 07:01  -  05 Sep 2021 05:45  --------------------------------------------------------  IN:    IV PiggyBack: 250 mL  Total IN: 250 mL    OUT:    Voided (mL): 950 mL  Total OUT: 950 mL    Total NET: -700 mL          Lab Data                        13.3   6.26  )-----------( 102      ( 04 Sep 2021 07:22 )             40.6     09-04    140  |  106  |  18  ----------------------------<  138<H>  4.4   |  26  |  0.71    Ca    7.5<L>      04 Sep 2021 07:22    TPro  6.0  /  Alb  2.3<L>  /  TBili  0.5  /  DBili  x   /  AST  80<H>  /  ALT  85<H>  /  AlkPhos  50  09-04            Review of Systems	      Objective     Physical Examination    heart s1s2  lung dec BS  abd soft  head nc  on o2 support      Pertinent Lab findings & Imaging      Bong:  NO   Adequate UO     I&O's Detail    04 Sep 2021 07:01  -  05 Sep 2021 05:45  --------------------------------------------------------  IN:    IV PiggyBack: 250 mL  Total IN: 250 mL    OUT:    Voided (mL): 950 mL  Total OUT: 950 mL    Total NET: -700 mL               Discussed with:     Cultures:	        Radiology

## 2021-09-05 NOTE — PROGRESS NOTE ADULT - SUBJECTIVE AND OBJECTIVE BOX
[facility]  INFECTIOUS DISEASES   =======================================================    [facility]-[id number]  SON MATIAS     Follow up: COVID-19     O2 requirement higher, 6L now but still NC, it went up during breakfast while eating and had brief hypoxia.   No GI symptoms. No fever.     PAST MEDICAL & SURGICAL HISTORY:  No pertinent past medical history  No significant past surgical history    Social Hx: no smoking or drugs, social ETOH    FAMILY HISTORY:  Family history of diabetes mellitus (DM) (Father, Mother)    Family history of hypertension (Father)    Family history of hypertension    Family history of hyperlipidemia (Father)    Allergies  No Known Allergies    Antibiotics:  remdesivir  IVPB   IV Intermittent      REVIEW OF SYSTEMS:  CONSTITUTIONAL:  No Fever or chills  HEENT:  No diplopia or blurred vision.  No sore throat or runny nose.  CARDIOVASCULAR:  No chest pain or SOB.  RESPIRATORY:  +cough, +shortness of breath  GASTROINTESTINAL:  No nausea, vomiting or diarrhea.  GENITOURINARY:  No dysuria, frequency or urgency. No Blood in urine  MUSCULOSKELETAL:  no joint aches, no muscle pain  SKIN:  No change in skin, hair or nails.  NEUROLOGIC:  No paresthesias, fasciculations, seizures or weakness.  PSYCHIATRIC:  No disorder of thought or mood.  ENDOCRINE:  No heat or cold intolerance, polyuria or polydipsia.  HEMATOLOGICAL:  No easy bruising or bleeding.     Physical Exam:  Vital Signs Last 24 Hrs  T(C): 36.6 (05 Sep 2021 12:45), Max: 36.8 (04 Sep 2021 20:13)  T(F): 97.9 (05 Sep 2021 12:45), Max: 98.3 (04 Sep 2021 20:13)  HR: 67 (05 Sep 2021 12:45) (63 - 69)  BP: 120/75 (05 Sep 2021 12:45) (113/72 - 120/75)  BP(mean): --  RR: 20 (05 Sep 2021 12:45) (18 - 20)  SpO2: 87% (05 Sep 2021 12:45) (87% - 94%)  GEN: NAD  HEENT: normocephalic and atraumatic. EOMI. PERRL.    NECK: Supple.  No lymphadenopathy   LUNGS: Clear to auscultation.  HEART: Regular rate and rhythm   ABDOMEN: Soft, nontender, and nondistended.  Positive bowel sounds.    EXTREMITIES: Without edema.  NEUROLOGIC: grossly intact.  PSYCHIATRIC: Appropriate affect .  SKIN: No rash      Labs:                        13.3   6.26  )-----------( 102      ( 04 Sep 2021 07:22 )             40.6     09-04    140  |  106  |  18  ----------------------------<  138<H>  4.4   |  26  |  0.71    Ca    7.5<L>      04 Sep 2021 07:22    TPro  6.0  /  Alb  2.3<L>  /  TBili  0.5  /  DBili  x   /  AST  80<H>  /  ALT  85<H>  /  AlkPhos  50  09-04    Culture - Blood (collected 09-03-21 @ 11:59)  Source: .Blood Blood    Culture - Blood (collected 09-03-21 @ 11:59)  Source: .Blood Blood      WBC Count: 6.26 K/uL (09-04-21 @ 07:22)  WBC Count: 7.53 K/uL (09-03-21 @ 09:08)    Creatinine, Serum: 0.71 mg/dL (09-04-21 @ 07:22)  Creatinine, Serum: 1.00 mg/dL (09-03-21 @ 09:08)    C-Reactive Protein, Serum: 106 mg/L (09-03-21 @ 11:17)    Ferritin, Serum: 3186 ng/mL (09-03-21 @ 11:17)    Procalcitonin, Serum: 1.93 ng/mL (09-03-21 @ 09:08)    COVID-19 PCR: Detected (09-03-21 @ 09:08)    All imaging and other data have been reviewed.  < from: Xray Chest 1 View- PORTABLE-Urgent (09.03.21 @ 09:11) >  EXAM:  XR CHEST PORTABLE URGENT 1V                        PROCEDURE DATE:  09/03/2021    INTERPRETATION:  AP semierect chest on September 3, 2021 at 9:05 AM. Patient is short of breath and is positive for COVID.  COMPARISON: None available.  Heart is normal for projection.  Scattered mid lower lung field infiltrates consistent with Covid Pneumonia are noted.  IMPRESSION: Bilateral infiltrates as above.    Assessment and Plan:   42 yo man with no significant PMH was admitted with cough, shortness of breath, fever, chills, and headache. His O2 sat was 79% and improved to 99% on NRB 15L. Now on NC o2 about 2-3L. Patient has been self-quarantined since August 25 when his symptoms first began. Patient has not had a COVID vaccine.   Treatment usually is different case by case, data suggest that these might work:   Remdisivir:  5 day course , ALT < 5X ULN  Steroid: For hypoxic patients on supplemental O2 of intubated. dexamethasone 6mg PO or IV Q-day x 10 days (equivalent to solumedrol 32mg IV or Prednisone 40mg)  Anticoagulation:  with prophylactic dosing, full dose to be considered in patients with increased risk for thromboembolic complications. Bleeding can happen but acceptable in high risk patients due to hypercoagulable state.  LMWH is good, for high risk patients consider discharge on oral anticoagulation with rivaroxaban (Xarelto) 10mg PO QD or Eliquis (apixaban) 2.5-5mg PO BID  x 4 weeks.  Currently data and recommendations for COVID-19 treatment are rapidly changing, so this treatment plan is based on my clinical judgement with available information.     COVID 19   - BMP, CBC w diff, NLR. Procalcitonin, Ferritin, CRP, LDH and D dimer for the start and periodically can be repeated.   - CXR with bilateral opacities more consistent with viral pneumonia   - Continue Remdesivir for total 5days, today day 3/5.   - Continue Dexamethasone 6mg po daily for 10days  - Follow Creat and LFTs daily while on Remdesivir.    - Watch O2 sat closely and taper as tolerated.   - No antibiotics at this time.  - Prophylactic anticoagulation as per protocol  - Will hold on Tocilizumab for now, if HFNC, will give him a dose    Will follow.     Dr. Nickie Rowe   Infectious Diseases   Please call [phone] between 8am and 6pm, call [phone] after 6pm or weekends.

## 2021-09-05 NOTE — PROGRESS NOTE ADULT - SUBJECTIVE AND OBJECTIVE BOX
MEDICAL ATTENDING NOTE    Patient is a 41y old  Male who presents with a chief complaint of COVID-19 (05 Sep 2021 05:45)      INTERVAL HPI/OVERNIGHT EVENTS: offers no new complaints today; feels ok    MEDICATIONS  (STANDING):  ALBUTerol    90 MICROgram(s) HFA Inhaler 2 Puff(s) Inhalation every 6 hours  ascorbic acid 500 milliGRAM(s) Oral daily  cholecalciferol 1000 Unit(s) Oral daily  dexAMETHasone     Tablet 6 milliGRAM(s) Oral daily  enoxaparin Injectable 40 milliGRAM(s) SubCutaneous every 12 hours  guaifenesin/dextromethorphan Oral Liquid 5 milliLiter(s) Oral every 6 hours  multivitamin 1 Tablet(s) Oral daily  remdesivir  IVPB 100 milliGRAM(s) IV Intermittent every 24 hours  remdesivir  IVPB   IV Intermittent   zinc sulfate 220 milliGRAM(s) Oral daily    MEDICATIONS  (PRN):  acetaminophen   Tablet .. 650 milliGRAM(s) Oral every 6 hours PRN Temp greater or equal to 38C (100.4F), Mild Pain (1 - 3)  acetaminophen   Tablet .. 650 milliGRAM(s) Oral every 4 hours PRN Temp greater or equal to 38C (100.4F)  acetaminophen  Suppository .. 650 milliGRAM(s) Rectal every 4 hours PRN Temp greater or equal to 38C (100.4F)  benzonatate 100 milliGRAM(s) Oral every 8 hours PRN Cough  polyethylene glycol 3350 17 Gram(s) Oral daily PRN Constipation  senna 2 Tablet(s) Oral at bedtime PRN Constipation      __________________________________________________  ----------------------------------------------------------------------------------  REVIEW OF SYSTEMS: negative for fever, palpitation and headache. mild SOB with activity; cough+- dry      Vital Signs Last 24 Hrs  T(C): 36.6 (05 Sep 2021 05:19), Max: 36.8 (04 Sep 2021 13:04)  T(F): 97.8 (05 Sep 2021 05:19), Max: 98.3 (04 Sep 2021 20:13)  HR: 69 (05 Sep 2021 05:19) (63 - 78)  BP: 114/75 (05 Sep 2021 05:19) (113/72 - 121/76)  RR: 18 (05 Sep 2021 05:19) (17 - 18)  SpO2: 92% (05 Sep 2021 05:19) (92% - 94%)    _________________  PHYSICAL EXAM:  ---------------------------   NAD; Normocephalic;   LUNGS - no wheezing  HEART: S1 S2+   ABDOMEN: Soft, Nontender, non distended, BS+  EXTREMITIES: no cyanosis; no edema  NERVOUS SYSTEM:  Awake and alert; no focal neuro deficits appreciated    _________________________________________________  LABS:                        13.3   6.26  )-----------( 102      ( 04 Sep 2021 07:22 )             40.6     09-04    140  |  106  |  18  ----------------------------<  138<H>  4.4   |  26  |  0.71    Ca    7.5<L>      04 Sep 2021 07:22    TPro  6.0  /  Alb  2.3<L>  /  TBili  0.5  /  DBili  x   /  AST  80<H>  /  ALT  85<H>  /  AlkPhos  50  09-04        CAPILLARY BLOOD GLUCOSE                    Plan of care was discussed with patient ; all questions and concerns were addressed and care was aligned with patient's wishes.             MEDICAL ATTENDING NOTE    Patient is a 41y old  Male who presents with a chief complaint of COVID-19 (05 Sep 2021 05:45)      INTERVAL HPI/OVERNIGHT EVENTS: offers no new complaints today; feels ok but worried about     MEDICATIONS  (STANDING):  ALBUTerol    90 MICROgram(s) HFA Inhaler 2 Puff(s) Inhalation every 6 hours  ascorbic acid 500 milliGRAM(s) Oral daily  cholecalciferol 1000 Unit(s) Oral daily  dexAMETHasone     Tablet 6 milliGRAM(s) Oral daily  enoxaparin Injectable 40 milliGRAM(s) SubCutaneous every 12 hours  guaifenesin/dextromethorphan Oral Liquid 5 milliLiter(s) Oral every 6 hours  multivitamin 1 Tablet(s) Oral daily  remdesivir  IVPB 100 milliGRAM(s) IV Intermittent every 24 hours  remdesivir  IVPB   IV Intermittent   zinc sulfate 220 milliGRAM(s) Oral daily    MEDICATIONS  (PRN):  acetaminophen   Tablet .. 650 milliGRAM(s) Oral every 6 hours PRN Temp greater or equal to 38C (100.4F), Mild Pain (1 - 3)  acetaminophen   Tablet .. 650 milliGRAM(s) Oral every 4 hours PRN Temp greater or equal to 38C (100.4F)  acetaminophen  Suppository .. 650 milliGRAM(s) Rectal every 4 hours PRN Temp greater or equal to 38C (100.4F)  benzonatate 100 milliGRAM(s) Oral every 8 hours PRN Cough  polyethylene glycol 3350 17 Gram(s) Oral daily PRN Constipation  senna 2 Tablet(s) Oral at bedtime PRN Constipation      __________________________________________________  ----------------------------------------------------------------------------------  REVIEW OF SYSTEMS: negative for fever, palpitation and headache. mild SOB with activity; cough+- dry      Vital Signs Last 24 Hrs  T(C): 36.6 (05 Sep 2021 05:19), Max: 36.8 (04 Sep 2021 13:04)  T(F): 97.8 (05 Sep 2021 05:19), Max: 98.3 (04 Sep 2021 20:13)  HR: 69 (05 Sep 2021 05:19) (63 - 78)  BP: 114/75 (05 Sep 2021 05:19) (113/72 - 121/76)  RR: 18 (05 Sep 2021 05:19) (17 - 18)  SpO2: 92% (05 Sep 2021 05:19) (92% - 94%)    _________________  PHYSICAL EXAM:  ---------------------------   NAD; Normocephalic;   LUNGS - no wheezing  HEART: S1 S2+   ABDOMEN: Soft, Nontender, non distended, BS+  EXTREMITIES: no cyanosis; no edema  NERVOUS SYSTEM:  Awake and alert; no focal neuro deficits appreciated    _________________________________________________  LABS:                        13.3   6.26  )-----------( 102      ( 04 Sep 2021 07:22 )             40.6     09-04    140  |  106  |  18  ----------------------------<  138<H>  4.4   |  26  |  0.71    Ca    7.5<L>      04 Sep 2021 07:22    TPro  6.0  /  Alb  2.3<L>  /  TBili  0.5  /  DBili  x   /  AST  80<H>  /  ALT  85<H>  /  AlkPhos  50  09-04        CAPILLARY BLOOD GLUCOSE                    Plan of care was discussed with patient ; all questions and concerns were addressed and care was aligned with patient's wishes.

## 2021-09-06 LAB
ANION GAP SERPL CALC-SCNC: 7 MMOL/L — SIGNIFICANT CHANGE UP (ref 5–17)
BUN SERPL-MCNC: 23 MG/DL — SIGNIFICANT CHANGE UP (ref 7–23)
CALCIUM SERPL-MCNC: 8.1 MG/DL — LOW (ref 8.5–10.1)
CHLORIDE SERPL-SCNC: 106 MMOL/L — SIGNIFICANT CHANGE UP (ref 96–108)
CO2 SERPL-SCNC: 28 MMOL/L — SIGNIFICANT CHANGE UP (ref 22–31)
CREAT SERPL-MCNC: 0.92 MG/DL — SIGNIFICANT CHANGE UP (ref 0.5–1.3)
CRP SERPL-MCNC: 10 MG/L — HIGH
FERRITIN SERPL-MCNC: 2840 NG/ML — HIGH (ref 30–400)
GLUCOSE SERPL-MCNC: 161 MG/DL — HIGH (ref 70–99)
HCT VFR BLD CALC: 41.5 % — SIGNIFICANT CHANGE UP (ref 39–50)
HGB BLD-MCNC: 13.7 G/DL — SIGNIFICANT CHANGE UP (ref 13–17)
MCHC RBC-ENTMCNC: 27.9 PG — SIGNIFICANT CHANGE UP (ref 27–34)
MCHC RBC-ENTMCNC: 33 GM/DL — SIGNIFICANT CHANGE UP (ref 32–36)
MCV RBC AUTO: 84.5 FL — SIGNIFICANT CHANGE UP (ref 80–100)
NRBC # BLD: 0 /100 WBCS — SIGNIFICANT CHANGE UP (ref 0–0)
PLATELET # BLD AUTO: 164 K/UL — SIGNIFICANT CHANGE UP (ref 150–400)
POTASSIUM SERPL-MCNC: 4.1 MMOL/L — SIGNIFICANT CHANGE UP (ref 3.5–5.3)
POTASSIUM SERPL-SCNC: 4.1 MMOL/L — SIGNIFICANT CHANGE UP (ref 3.5–5.3)
RBC # BLD: 4.91 M/UL — SIGNIFICANT CHANGE UP (ref 4.2–5.8)
RBC # FLD: 12.8 % — SIGNIFICANT CHANGE UP (ref 10.3–14.5)
SODIUM SERPL-SCNC: 141 MMOL/L — SIGNIFICANT CHANGE UP (ref 135–145)
WBC # BLD: 6.39 K/UL — SIGNIFICANT CHANGE UP (ref 3.8–10.5)
WBC # FLD AUTO: 6.39 K/UL — SIGNIFICANT CHANGE UP (ref 3.8–10.5)

## 2021-09-06 PROCEDURE — 99232 SBSQ HOSP IP/OBS MODERATE 35: CPT

## 2021-09-06 PROCEDURE — 99233 SBSQ HOSP IP/OBS HIGH 50: CPT

## 2021-09-06 RX ADMIN — Medication 500 MILLIGRAM(S): at 15:51

## 2021-09-06 RX ADMIN — ENOXAPARIN SODIUM 40 MILLIGRAM(S): 100 INJECTION SUBCUTANEOUS at 06:40

## 2021-09-06 RX ADMIN — ALBUTEROL 2 PUFF(S): 90 AEROSOL, METERED ORAL at 15:54

## 2021-09-06 RX ADMIN — REMDESIVIR 500 MILLIGRAM(S): 5 INJECTION INTRAVENOUS at 14:23

## 2021-09-06 RX ADMIN — ALBUTEROL 2 PUFF(S): 90 AEROSOL, METERED ORAL at 06:42

## 2021-09-06 RX ADMIN — Medication 6 MILLIGRAM(S): at 06:40

## 2021-09-06 RX ADMIN — Medication 5 MILLILITER(S): at 15:51

## 2021-09-06 RX ADMIN — Medication 1 TABLET(S): at 15:51

## 2021-09-06 RX ADMIN — Medication 5 MILLILITER(S): at 06:40

## 2021-09-06 RX ADMIN — Medication 5 MILLILITER(S): at 23:30

## 2021-09-06 RX ADMIN — Medication 5 MILLILITER(S): at 18:05

## 2021-09-06 RX ADMIN — Medication 1000 UNIT(S): at 15:51

## 2021-09-06 RX ADMIN — ENOXAPARIN SODIUM 40 MILLIGRAM(S): 100 INJECTION SUBCUTANEOUS at 18:04

## 2021-09-06 RX ADMIN — ZINC SULFATE TAB 220 MG (50 MG ZINC EQUIVALENT) 220 MILLIGRAM(S): 220 (50 ZN) TAB at 15:51

## 2021-09-06 NOTE — PROGRESS NOTE ADULT - SUBJECTIVE AND OBJECTIVE BOX
Date/Time Patient Seen:  		  Referring MD:   Data Reviewed	       Patient is a 41y old  Male who presents with a chief complaint of COVID-19 (05 Sep 2021 13:25)      Subjective/HPI     PAST MEDICAL & SURGICAL HISTORY:  No pertinent past medical history    No significant past surgical history          Medication list         MEDICATIONS  (STANDING):  ALBUTerol    90 MICROgram(s) HFA Inhaler 2 Puff(s) Inhalation every 6 hours  ascorbic acid 500 milliGRAM(s) Oral daily  cholecalciferol 1000 Unit(s) Oral daily  dexAMETHasone     Tablet 6 milliGRAM(s) Oral daily  enoxaparin Injectable 40 milliGRAM(s) SubCutaneous every 12 hours  guaifenesin/dextromethorphan Oral Liquid 5 milliLiter(s) Oral every 6 hours  multivitamin 1 Tablet(s) Oral daily  remdesivir  IVPB 100 milliGRAM(s) IV Intermittent every 24 hours  remdesivir  IVPB   IV Intermittent   zinc sulfate 220 milliGRAM(s) Oral daily    MEDICATIONS  (PRN):  acetaminophen   Tablet .. 650 milliGRAM(s) Oral every 6 hours PRN Temp greater or equal to 38C (100.4F), Mild Pain (1 - 3)  acetaminophen   Tablet .. 650 milliGRAM(s) Oral every 4 hours PRN Temp greater or equal to 38C (100.4F)  acetaminophen  Suppository .. 650 milliGRAM(s) Rectal every 4 hours PRN Temp greater or equal to 38C (100.4F)  benzonatate 100 milliGRAM(s) Oral every 8 hours PRN Cough  polyethylene glycol 3350 17 Gram(s) Oral daily PRN Constipation  senna 2 Tablet(s) Oral at bedtime PRN Constipation         Vitals log        ICU Vital Signs Last 24 Hrs  T(C): 36.6 (06 Sep 2021 05:41), Max: 36.7 (05 Sep 2021 20:17)  T(F): 97.9 (06 Sep 2021 05:41), Max: 98.1 (05 Sep 2021 20:17)  HR: 63 (06 Sep 2021 05:41) (56 - 67)  BP: 121/79 (06 Sep 2021 05:41) (120/75 - 124/77)  BP(mean): --  ABP: --  ABP(mean): --  RR: 18 (06 Sep 2021 05:41) (18 - 20)  SpO2: 93% (06 Sep 2021 05:41) (87% - 95%)           Input and Output:  I&O's Detail    05 Sep 2021 07:01  -  06 Sep 2021 07:00  --------------------------------------------------------  IN:  Total IN: 0 mL    OUT:    Voided (mL): 1475 mL  Total OUT: 1475 mL    Total NET: -1475 mL          Lab Data                  Review of Systems	      Objective     Physical Examination    heart s1s2  lung dec BS  abd soft      Pertinent Lab findings & Imaging      Bong:  NO   Adequate UO     I&O's Detail    05 Sep 2021 07:01  -  06 Sep 2021 07:00  --------------------------------------------------------  IN:  Total IN: 0 mL    OUT:    Voided (mL): 1475 mL  Total OUT: 1475 mL    Total NET: -1475 mL               Discussed with:     Cultures:	        Radiology

## 2021-09-06 NOTE — PROGRESS NOTE ADULT - SUBJECTIVE AND OBJECTIVE BOX
[facility]  INFECTIOUS DISEASES   =======================================================    [facility]-[id number]  SON MATIAS     Follow up: COVID-19     O2 requirement 5L now stable, yesterday was 6L.   No GI symptoms. No fever.   Feels comfortable.     PAST MEDICAL & SURGICAL HISTORY:  No pertinent past medical history  No significant past surgical history    Social Hx: no smoking or drugs, social ETOH    FAMILY HISTORY:  Family history of diabetes mellitus (DM) (Father, Mother)    Family history of hypertension (Father)    Family history of hypertension    Family history of hyperlipidemia (Father)    Allergies  No Known Allergies    Antibiotics:  remdesivir  IVPB   IV Intermittent      REVIEW OF SYSTEMS:  CONSTITUTIONAL:  No Fever or chills  HEENT:  No diplopia or blurred vision.  No sore throat or runny nose.  CARDIOVASCULAR:  No chest pain or SOB.  RESPIRATORY:  +cough, +shortness of breath  GASTROINTESTINAL:  No nausea, vomiting or diarrhea.  GENITOURINARY:  No dysuria, frequency or urgency. No Blood in urine  MUSCULOSKELETAL:  no joint aches, no muscle pain  SKIN:  No change in skin, hair or nails.  NEUROLOGIC:  No paresthesias, fasciculations, seizures or weakness.  PSYCHIATRIC:  No disorder of thought or mood.  ENDOCRINE:  No heat or cold intolerance, polyuria or polydipsia.  HEMATOLOGICAL:  No easy bruising or bleeding.     Physical Exam:  Vital Signs Last 24 Hrs  T(C): 36.9 (06 Sep 2021 13:20), Max: 36.9 (06 Sep 2021 13:20)  T(F): 98.4 (06 Sep 2021 13:20), Max: 98.4 (06 Sep 2021 13:20)  HR: 65 (06 Sep 2021 13:20) (56 - 65)  BP: 124/79 (06 Sep 2021 13:20) (121/79 - 124/79)  BP(mean): --  RR: 18 (06 Sep 2021 13:20) (18 - 19)  SpO2: 90% (06 Sep 2021 13:20) (90% - 95%)  GEN: NAD  HEENT: normocephalic and atraumatic. EOMI. PERRL.    NECK: Supple.  No lymphadenopathy   LUNGS: Clear to auscultation.  HEART: Regular rate and rhythm   ABDOMEN: Soft, nontender, and nondistended.  Positive bowel sounds.    EXTREMITIES: Without edema.  NEUROLOGIC: grossly intact.  PSYCHIATRIC: Appropriate affect .  SKIN: No rash      Labs:                        13.7   6.39  )-----------( 164      ( 06 Sep 2021 09:23 )             41.5     09-06    141  |  106  |  23  ----------------------------<  161<H>  4.1   |  28  |  0.92    Ca    8.1<L>      06 Sep 2021 09:23    Culture - Blood (collected 09-03-21 @ 11:59)  Source: .Blood Blood    Culture - Blood (collected 09-03-21 @ 11:59)  Source: .Blood Blood    WBC Count: 6.39 K/uL (09-06-21 @ 09:23)  WBC Count: 6.26 K/uL (09-04-21 @ 07:22)  WBC Count: 7.53 K/uL (09-03-21 @ 09:08)    Creatinine, Serum: 0.92 mg/dL (09-06-21 @ 09:23)  Creatinine, Serum: 0.71 mg/dL (09-04-21 @ 07:22)  Creatinine, Serum: 1.00 mg/dL (09-03-21 @ 09:08)    C-Reactive Protein, Serum: 106 mg/L (09-03-21 @ 11:17)    Ferritin, Serum: 3186 ng/mL (09-03-21 @ 11:17)    Procalcitonin, Serum: 1.93 ng/mL (09-03-21 @ 09:08)     COVID-19 PCR: Detected (09-03-21 @ 09:08)    All imaging and other data have been reviewed.  < from: Xray Chest 1 View- PORTABLE-Urgent (09.03.21 @ 09:11) >  EXAM:  XR CHEST PORTABLE URGENT 1V                        PROCEDURE DATE:  09/03/2021    INTERPRETATION:  AP semierect chest on September 3, 2021 at 9:05 AM. Patient is short of breath and is positive for COVID.  COMPARISON: None available.  Heart is normal for projection.  Scattered mid lower lung field infiltrates consistent with Covid Pneumonia are noted.  IMPRESSION: Bilateral infiltrates as above.    Assessment and Plan:   42 yo man with no significant PMH was admitted with cough, shortness of breath, fever, chills, and headache. His O2 sat was 79% and improved to 99% on NRB 15L. Now on NC o2 about 2-3L. Patient has been self-quarantined since August 25 when his symptoms first began. Patient has not had a COVID vaccine.   Treatment usually is different case by case, data suggest that these might work:   Remdisivir:  5 day course , ALT < 5X ULN  Steroid: For hypoxic patients on supplemental O2 of intubated. dexamethasone 6mg PO or IV Q-day x 10 days (equivalent to solumedrol 32mg IV or Prednisone 40mg)  Anticoagulation:  with prophylactic dosing, full dose to be considered in patients with increased risk for thromboembolic complications. Bleeding can happen but acceptable in high risk patients due to hypercoagulable state.  LMWH is good, for high risk patients consider discharge on oral anticoagulation with rivaroxaban (Xarelto) 10mg PO QD or Eliquis (apixaban) 2.5-5mg PO BID  x 4 weeks.  Currently data and recommendations for COVID-19 treatment are rapidly changing, so this treatment plan is based on my clinical judgement with available information.     COVID 19   - BMP, CBC w diff, NLR. Procalcitonin, Ferritin, CRP, LDH and D dimer for the start and periodically can be repeated.   - CXR with bilateral opacities more consistent with viral pneumonia   - Continue Remdesivir for total 5days, today day 4/5.   - Continue Dexamethasone 6mg po daily for 10days  - Follow Creat and LFTs daily while on Remdesivir.    - Watch O2 sat closely and taper as tolerated.   - No antibiotics at this time.  - Prophylactic anticoagulation as per protocol  - Will hold on Tocilizumab for now, if HFNC, will give him a dose    Will follow.     Dr. Nickie Rowe   Infectious Diseases   Please call [phone] between 8am and 6pm, call [phone] after 6pm or weekends.

## 2021-09-06 NOTE — PROGRESS NOTE ADULT - SUBJECTIVE AND OBJECTIVE BOX
MEDICAL ATTENDING NOTE    Patient is a 41y old  Male who presents with a chief complaint of COVID-19 (06 Sep 2021 07:58)      INTERVAL HPI/OVERNIGHT EVENTS: offers no new complaints today    MEDICATIONS  (STANDING):  ALBUTerol    90 MICROgram(s) HFA Inhaler 2 Puff(s) Inhalation every 6 hours  ascorbic acid 500 milliGRAM(s) Oral daily  cholecalciferol 1000 Unit(s) Oral daily  dexAMETHasone     Tablet 6 milliGRAM(s) Oral daily  enoxaparin Injectable 40 milliGRAM(s) SubCutaneous every 12 hours  guaifenesin/dextromethorphan Oral Liquid 5 milliLiter(s) Oral every 6 hours  multivitamin 1 Tablet(s) Oral daily  remdesivir  IVPB 100 milliGRAM(s) IV Intermittent every 24 hours  remdesivir  IVPB   IV Intermittent   zinc sulfate 220 milliGRAM(s) Oral daily    MEDICATIONS  (PRN):  acetaminophen   Tablet .. 650 milliGRAM(s) Oral every 6 hours PRN Temp greater or equal to 38C (100.4F), Mild Pain (1 - 3)  acetaminophen   Tablet .. 650 milliGRAM(s) Oral every 4 hours PRN Temp greater or equal to 38C (100.4F)  acetaminophen  Suppository .. 650 milliGRAM(s) Rectal every 4 hours PRN Temp greater or equal to 38C (100.4F)  benzonatate 100 milliGRAM(s) Oral every 8 hours PRN Cough  polyethylene glycol 3350 17 Gram(s) Oral daily PRN Constipation  senna 2 Tablet(s) Oral at bedtime PRN Constipation      __________________________________________________  ----------------------------------------------------------------------------------  REVIEW OF SYSTEMS: negative for fever, chest pain or HA; mild intermittent cough       Vital Signs Last 24 Hrs  T(C): 36.9 (06 Sep 2021 13:20), Max: 36.9 (06 Sep 2021 13:20)  T(F): 98.4 (06 Sep 2021 13:20), Max: 98.4 (06 Sep 2021 13:20)  HR: 65 (06 Sep 2021 13:20) (56 - 65)  BP: 124/79 (06 Sep 2021 13:20) (121/79 - 124/79)  RR: 18 (06 Sep 2021 13:20) (18 - 19)  SpO2: 90% (06 Sep 2021 13:20) (90% - 95%)    _________________  PHYSICAL EXAM:  ---------------------------   NAD; Normocephalic;   LUNGS - no wheezing; decreased air entry bibasilar   HEART: S1 S2+   ABDOMEN: Soft, Nontender, non distended; BS+_  EXTREMITIES: no cyanosis; no edema  NERVOUS SYSTEM:  Awake and alert; no focal neuro deficits appreciated    _________________________________________________  LABS:                        13.7   6.39  )-----------( 164      ( 06 Sep 2021 09:23 )             41.5     09-06    141  |  106  |  23  ----------------------------<  161<H>  4.1   |  28  |  0.92    Ca    8.1<L>      06 Sep 2021 09:23          CAPILLARY BLOOD GLUCOSE                    Plan of care was discussed with patient ; all questions and concerns were addressed and care was aligned with patient's wishes.

## 2021-09-06 NOTE — PROGRESS NOTE ADULT - PROBLEM SELECTOR PLAN 1
-  acute hypoxic respiratory failure due to COVID-19 infection with associated PNA  - continue Supplemental Oxygen as needed and titrate to maintain O2 sats >88%. Prone as tolerated  - continue Decadron #4 of 10  - Remdesivir # 4 of 5  - continue Robitussin for cough  - Tylenol prn myalgias, fever  - Lovenox for DVT prophylaxis  - Isolation precautions per COVID-19 infection control protocol  - Monitor renal function  - GOC: full code  - Pulm and ID follow up ongoing  - Low threshold for ICU consult of deteriorates clinically  - Prognosis is guarded

## 2021-09-07 LAB
ANION GAP SERPL CALC-SCNC: 5 MMOL/L — SIGNIFICANT CHANGE UP (ref 5–17)
BUN SERPL-MCNC: 23 MG/DL — SIGNIFICANT CHANGE UP (ref 7–23)
CALCIUM SERPL-MCNC: 8 MG/DL — LOW (ref 8.5–10.1)
CHLORIDE SERPL-SCNC: 105 MMOL/L — SIGNIFICANT CHANGE UP (ref 96–108)
CO2 SERPL-SCNC: 30 MMOL/L — SIGNIFICANT CHANGE UP (ref 22–31)
CREAT SERPL-MCNC: 0.94 MG/DL — SIGNIFICANT CHANGE UP (ref 0.5–1.3)
CRP SERPL-MCNC: 5 MG/L — HIGH
D DIMER BLD IA.RAPID-MCNC: 236 NG/ML DDU — HIGH
FERRITIN SERPL-MCNC: 1801 NG/ML — HIGH (ref 30–400)
GLUCOSE SERPL-MCNC: 134 MG/DL — HIGH (ref 70–99)
HCT VFR BLD CALC: 42.6 % — SIGNIFICANT CHANGE UP (ref 39–50)
HGB BLD-MCNC: 14 G/DL — SIGNIFICANT CHANGE UP (ref 13–17)
MCHC RBC-ENTMCNC: 27.6 PG — SIGNIFICANT CHANGE UP (ref 27–34)
MCHC RBC-ENTMCNC: 32.9 GM/DL — SIGNIFICANT CHANGE UP (ref 32–36)
MCV RBC AUTO: 83.9 FL — SIGNIFICANT CHANGE UP (ref 80–100)
NRBC # BLD: 0 /100 WBCS — SIGNIFICANT CHANGE UP (ref 0–0)
PLATELET # BLD AUTO: 177 K/UL — SIGNIFICANT CHANGE UP (ref 150–400)
POTASSIUM SERPL-MCNC: 4.2 MMOL/L — SIGNIFICANT CHANGE UP (ref 3.5–5.3)
POTASSIUM SERPL-SCNC: 4.2 MMOL/L — SIGNIFICANT CHANGE UP (ref 3.5–5.3)
RBC # BLD: 5.08 M/UL — SIGNIFICANT CHANGE UP (ref 4.2–5.8)
RBC # FLD: 12.8 % — SIGNIFICANT CHANGE UP (ref 10.3–14.5)
SODIUM SERPL-SCNC: 140 MMOL/L — SIGNIFICANT CHANGE UP (ref 135–145)
WBC # BLD: 9.26 K/UL — SIGNIFICANT CHANGE UP (ref 3.8–10.5)
WBC # FLD AUTO: 9.26 K/UL — SIGNIFICANT CHANGE UP (ref 3.8–10.5)

## 2021-09-07 PROCEDURE — 99232 SBSQ HOSP IP/OBS MODERATE 35: CPT

## 2021-09-07 RX ORDER — ENOXAPARIN SODIUM 100 MG/ML
40 INJECTION SUBCUTANEOUS DAILY
Refills: 0 | Status: DISCONTINUED | OUTPATIENT
Start: 2021-09-08 | End: 2021-09-08

## 2021-09-07 RX ADMIN — Medication 1 TABLET(S): at 11:34

## 2021-09-07 RX ADMIN — Medication 5 MILLILITER(S): at 05:31

## 2021-09-07 RX ADMIN — ALBUTEROL 2 PUFF(S): 90 AEROSOL, METERED ORAL at 08:16

## 2021-09-07 RX ADMIN — REMDESIVIR 500 MILLIGRAM(S): 5 INJECTION INTRAVENOUS at 11:34

## 2021-09-07 RX ADMIN — Medication 500 MILLIGRAM(S): at 11:34

## 2021-09-07 RX ADMIN — ZINC SULFATE TAB 220 MG (50 MG ZINC EQUIVALENT) 220 MILLIGRAM(S): 220 (50 ZN) TAB at 11:34

## 2021-09-07 RX ADMIN — Medication 5 MILLILITER(S): at 11:34

## 2021-09-07 RX ADMIN — Medication 5 MILLILITER(S): at 23:30

## 2021-09-07 RX ADMIN — Medication 1000 UNIT(S): at 11:34

## 2021-09-07 RX ADMIN — ALBUTEROL 2 PUFF(S): 90 AEROSOL, METERED ORAL at 20:26

## 2021-09-07 RX ADMIN — ENOXAPARIN SODIUM 40 MILLIGRAM(S): 100 INJECTION SUBCUTANEOUS at 05:31

## 2021-09-07 RX ADMIN — ALBUTEROL 2 PUFF(S): 90 AEROSOL, METERED ORAL at 15:53

## 2021-09-07 RX ADMIN — Medication 5 MILLILITER(S): at 17:02

## 2021-09-07 RX ADMIN — Medication 6 MILLIGRAM(S): at 05:31

## 2021-09-07 NOTE — PROGRESS NOTE ADULT - SUBJECTIVE AND OBJECTIVE BOX
MEDICAL ATTENDING NOTE    Patient is a 41y old  Male who presents with a chief complaint of COVID-19 (07 Sep 2021 12:21)      INTERVAL HPI/OVERNIGHT EVENTS: offers no new complaints today; felt more SOB this morning while washing up; required increased oxygen support    MEDICATIONS  (STANDING):  ALBUTerol    90 MICROgram(s) HFA Inhaler 2 Puff(s) Inhalation every 6 hours  ascorbic acid 500 milliGRAM(s) Oral daily  cholecalciferol 1000 Unit(s) Oral daily  dexAMETHasone     Tablet 6 milliGRAM(s) Oral daily  guaifenesin/dextromethorphan Oral Liquid 5 milliLiter(s) Oral every 6 hours  multivitamin 1 Tablet(s) Oral daily  zinc sulfate 220 milliGRAM(s) Oral daily    MEDICATIONS  (PRN):  acetaminophen   Tablet .. 650 milliGRAM(s) Oral every 6 hours PRN Temp greater or equal to 38C (100.4F), Mild Pain (1 - 3)  acetaminophen   Tablet .. 650 milliGRAM(s) Oral every 4 hours PRN Temp greater or equal to 38C (100.4F)  acetaminophen  Suppository .. 650 milliGRAM(s) Rectal every 4 hours PRN Temp greater or equal to 38C (100.4F)  benzonatate 100 milliGRAM(s) Oral every 8 hours PRN Cough  polyethylene glycol 3350 17 Gram(s) Oral daily PRN Constipation  senna 2 Tablet(s) Oral at bedtime PRN Constipation      __________________________________________________  ----------------------------------------------------------------------------------  REVIEW OF SYSTEMS: negative for fever or chest pain;  no palpitation      Vital Signs Last 24 Hrs  T(C): 36.7 (07 Sep 2021 13:44), Max: 36.8 (06 Sep 2021 21:30)  T(F): 98 (07 Sep 2021 13:44), Max: 98.3 (06 Sep 2021 21:30)  HR: 69 (07 Sep 2021 13:44) (48 - 69)  BP: 123/78 (07 Sep 2021 13:44) (120/77 - 131/80)  RR: 18 (07 Sep 2021 13:44) (18 - 18)  SpO2: 97% (07 Sep 2021 13:44) (94% - 98%)    _________________  PHYSICAL EXAM:  ---------------------------   NAD; Normocephalic;   LUNGS - no wheezing, fair bilateral air entry  HEART: S1 S2+   ABDOMEN: Soft, Nontender, non distended, BS+  EXTREMITIES: no cyanosis; no edema  NERVOUS SYSTEM:  Awake and alert; no focal neuro deficits appreciated; ambulated in the room    _________________________________________________  LABS:                        14.0   9.26  )-----------( 177      ( 07 Sep 2021 09:16 )             42.6     09-07    140  |  105  |  23  ----------------------------<  134<H>  4.2   |  30  |  0.94    Ca    8.0<L>      07 Sep 2021 09:16                              Plan of care was discussed with patient ; all questions and concerns were addressed and care was aligned with patient's wishes.

## 2021-09-07 NOTE — PROGRESS NOTE ADULT - SUBJECTIVE AND OBJECTIVE BOX
[facility]  INFECTIOUS DISEASES   =======================================================    [id number]  SON MATIAS     Follow up: COVID-19     O2 NC 3L, sitting on chair, comfortable   No GI symptoms. No fever.     PAST MEDICAL & SURGICAL HISTORY:  No pertinent past medical history  No significant past surgical history    Social Hx: no smoking or drugs, social ETOH    FAMILY HISTORY:  Family history of diabetes mellitus (DM) (Father, Mother)    Family history of hypertension (Father)    Family history of hypertension    Family history of hyperlipidemia (Father)    Allergies  No Known Allergies    Antibiotics:  remdesivir  IVPB   IV Intermittent      REVIEW OF SYSTEMS:  CONSTITUTIONAL:  No Fever or chills  HEENT:  No diplopia or blurred vision.  No sore throat or runny nose.  CARDIOVASCULAR:  No chest pain or SOB.  RESPIRATORY:  +cough, +shortness of breath  GASTROINTESTINAL:  No nausea, vomiting or diarrhea.  GENITOURINARY:  No dysuria, frequency or urgency. No Blood in urine  MUSCULOSKELETAL:  no joint aches, no muscle pain  SKIN:  No change in skin, hair or nails.  NEUROLOGIC:  No paresthesias, fasciculations, seizures or weakness.  PSYCHIATRIC:  No disorder of thought or mood.  ENDOCRINE:  No heat or cold intolerance, polyuria or polydipsia.  HEMATOLOGICAL:  No easy bruising or bleeding.     Physical Exam:  Vital Signs Last 24 Hrs  T(C): 36.8 (07 Sep 2021 05:29), Max: 36.9 (06 Sep 2021 13:20)  T(F): 98.3 (07 Sep 2021 05:29), Max: 98.4 (06 Sep 2021 13:20)  HR: 58 (07 Sep 2021 05:29) (48 - 65)  BP: 120/77 (07 Sep 2021 05:29) (120/77 - 131/80)  BP(mean): --  RR: 18 (07 Sep 2021 05:29) (18 - 18)  SpO2: 97% (07 Sep 2021 05:29) (90% - 98%)  GEN: NAD  HEENT: normocephalic and atraumatic. EOMI. PERRL.    NECK: Supple.  No lymphadenopathy   LUNGS: Clear to auscultation.  HEART: Regular rate and rhythm   ABDOMEN: Soft, nontender, and nondistended.  Positive bowel sounds.    EXTREMITIES: Without edema.  NEUROLOGIC: grossly intact.  PSYCHIATRIC: Appropriate affect .  SKIN: No rash    Labs:                        14.0   9.26  )-----------( 177      ( 07 Sep 2021 09:16 )             42.6     09-07    140  |  105  |  23  ----------------------------<  134<H>  4.2   |  30  |  0.94    Ca    8.0<L>      07 Sep 2021 09:16    Culture - Blood (collected 09-03-21 @ 11:59)  Source: .Blood Blood    Culture - Blood (collected 09-03-21 @ 11:59)  Source: .Blood Blood    WBC Count: 9.26 K/uL (09-07-21 @ 09:16)  WBC Count: 6.39 K/uL (09-06-21 @ 09:23)  WBC Count: 6.26 K/uL (09-04-21 @ 07:22)  WBC Count: 7.53 K/uL (09-03-21 @ 09:08)    Creatinine, Serum: 0.94 mg/dL (09-07-21 @ 09:16)  Creatinine, Serum: 0.92 mg/dL (09-06-21 @ 09:23)  Creatinine, Serum: 0.71 mg/dL (09-04-21 @ 07:22)  Creatinine, Serum: 1.00 mg/dL (09-03-21 @ 09:08)    C-Reactive Protein, Serum: 10 mg/L (09-06-21 @ 13:52)  C-Reactive Protein, Serum: 106 mg/L (09-03-21 @ 11:17)    Ferritin, Serum: 2840 ng/mL (09-06-21 @ 13:50)  Ferritin, Serum: 3186 ng/mL (09-03-21 @ 11:17)    Procalcitonin, Serum: 1.93 ng/mL (09-03-21 @ 09:08)     COVID-19 PCR: Detected (09-03-21 @ 09:08)    All imaging and other data have been reviewed.  < from: Xray Chest 1 View- PORTABLE-Urgent (09.03.21 @ 09:11) >  EXAM:  XR CHEST PORTABLE URGENT 1V                        PROCEDURE DATE:  09/03/2021    INTERPRETATION:  AP semierect chest on September 3, 2021 at 9:05 AM. Patient is short of breath and is positive for COVID.  COMPARISON: None available.  Heart is normal for projection.  Scattered mid lower lung field infiltrates consistent with Covid Pneumonia are noted.  IMPRESSION: Bilateral infiltrates as above.    Assessment and Plan:   42 yo man with no significant PMH was admitted with cough, shortness of breath, fever, chills, and headache. His O2 sat was 79% and improved to 99% on NRB 15L. Now on NC o2 about 2-3L. Patient has been self-quarantined since August 25 when his symptoms first began. Patient has not had a COVID vaccine.   Treatment usually is different case by case, data suggest that these might work:   Remdisivir:  5 day course , ALT < 5X ULN  Steroid: For hypoxic patients on supplemental O2 of intubated. dexamethasone 6mg PO or IV Q-day x 10 days (equivalent to solumedrol 32mg IV or Prednisone 40mg)  Anticoagulation:  with prophylactic dosing, full dose to be considered in patients with increased risk for thromboembolic complications. Bleeding can happen but acceptable in high risk patients due to hypercoagulable state.  LMWH is good, for high risk patients consider discharge on oral anticoagulation with rivaroxaban (Xarelto) 10mg PO QD or Eliquis (apixaban) 2.5-5mg PO BID  x 4 weeks.  Currently data and recommendations for COVID-19 treatment are rapidly changing, so this treatment plan is based on my clinical judgement with available information.     COVID 19   - BMP, CBC w diff, NLR. Procalcitonin, Ferritin, CRP, LDH and D dimer for the start and periodically can be repeated.   - CXR with bilateral opacities more consistent with viral pneumonia   - Continue Remdesivir for total 5days, today is the last day.   - Continue Dexamethasone 6mg po daily for 10days  - Follow Creat and LFTs daily while on Remdesivir.    - Watch O2 sat closely and taper as tolerated.   - No antibiotics at this time.  - Prophylactic anticoagulation as per protocol  - Will hold on Tocilizumab for now    Will follow PRN.   D/W Dr. Gannon.     Dr. Nickie Rowe   Infectious Diseases   Please call [phone] between 8am and 6pm, call [phone] after 6pm or weekends.

## 2021-09-07 NOTE — PROGRESS NOTE ADULT - PROBLEM SELECTOR PLAN 1
-  acute hypoxic respiratory failure due to COVID-19 infection with associated PNA  - continue Supplemental Oxygen as needed and titrate to maintain O2 sats >88%.   - encourage Proning as tolerated  - continue Decadron #5 of 10  - Remdesivir # 5 of 5  - continue Robitussin for cough  - Tylenol prn myalgias, fever  - Lovenox for DVT prophylaxis  - Isolation precautions per COVID-19 infection control protocol  - Monitor renal function  - GOC: full code  - Pulm and ID follow up ongoing  - Low threshold for ICU consult of deteriorates clinically  - Prognosis is guarded

## 2021-09-07 NOTE — PROGRESS NOTE ADULT - TIME-BASED BILLING (NON-CRITICAL CARE)
Time-based billing (NON-critical care)
Time-based billing (NON-critical care)
Attending Attestation (For Attendings USE Only)...

## 2021-09-07 NOTE — PROGRESS NOTE ADULT - TIME BILLING
direct care of a COVID patient with respiratory failure including but not limited to reviewing chart, medications ,laboratory data, imaging reports, discussion of plan of care with consultants on the case, coordination of care with multidisciplinary team involved in the case and discussion of plan with patient.  Patient  receptive and agreeable to plan of care and verbalized understanding the anticipated hospital course, unpredictable COVID-19 trajectory, treatment plan and guarded prognosis.
direct patient care including but not limited to reviewing chart, medications ,laboratory data, imaging reports, discussion of plan of care with consultants on the case, coordination of care with multidisciplinary team involved in the case and discussion of plan with patient.  Patient receptive and agreeable to plan of care and verbalized understanding the anticipated hospital course and treatment plan.

## 2021-09-07 NOTE — PROGRESS NOTE ADULT - SUBJECTIVE AND OBJECTIVE BOX
Date/Time Patient Seen:  		  Referring MD:   Data Reviewed	       Patient is a 41y old  Male who presents with a chief complaint of COVID-19 (06 Sep 2021 13:53)      Subjective/HPI     PAST MEDICAL & SURGICAL HISTORY:  No pertinent past medical history    No significant past surgical history          Medication list         MEDICATIONS  (STANDING):  ALBUTerol    90 MICROgram(s) HFA Inhaler 2 Puff(s) Inhalation every 6 hours  ascorbic acid 500 milliGRAM(s) Oral daily  cholecalciferol 1000 Unit(s) Oral daily  dexAMETHasone     Tablet 6 milliGRAM(s) Oral daily  enoxaparin Injectable 40 milliGRAM(s) SubCutaneous every 12 hours  guaifenesin/dextromethorphan Oral Liquid 5 milliLiter(s) Oral every 6 hours  multivitamin 1 Tablet(s) Oral daily  remdesivir  IVPB 100 milliGRAM(s) IV Intermittent every 24 hours  remdesivir  IVPB   IV Intermittent   zinc sulfate 220 milliGRAM(s) Oral daily    MEDICATIONS  (PRN):  acetaminophen   Tablet .. 650 milliGRAM(s) Oral every 6 hours PRN Temp greater or equal to 38C (100.4F), Mild Pain (1 - 3)  acetaminophen   Tablet .. 650 milliGRAM(s) Oral every 4 hours PRN Temp greater or equal to 38C (100.4F)  acetaminophen  Suppository .. 650 milliGRAM(s) Rectal every 4 hours PRN Temp greater or equal to 38C (100.4F)  benzonatate 100 milliGRAM(s) Oral every 8 hours PRN Cough  polyethylene glycol 3350 17 Gram(s) Oral daily PRN Constipation  senna 2 Tablet(s) Oral at bedtime PRN Constipation         Vitals log        ICU Vital Signs Last 24 Hrs  T(C): 36.8 (07 Sep 2021 05:29), Max: 36.9 (06 Sep 2021 13:20)  T(F): 98.3 (07 Sep 2021 05:29), Max: 98.4 (06 Sep 2021 13:20)  HR: 58 (07 Sep 2021 05:29) (48 - 65)  BP: 120/77 (07 Sep 2021 05:29) (120/77 - 131/80)  BP(mean): --  ABP: --  ABP(mean): --  RR: 18 (07 Sep 2021 05:29) (18 - 18)  SpO2: 97% (07 Sep 2021 05:29) (90% - 98%)           Input and Output:  I&O's Detail    05 Sep 2021 07:01  -  06 Sep 2021 07:00  --------------------------------------------------------  IN:  Total IN: 0 mL    OUT:    Voided (mL): 1475 mL  Total OUT: 1475 mL    Total NET: -1475 mL      06 Sep 2021 07:01  -  07 Sep 2021 05:53  --------------------------------------------------------  IN:    Oral Fluid: 300 mL  Total IN: 300 mL    OUT:  Total OUT: 0 mL    Total NET: 300 mL          Lab Data                        13.7   6.39  )-----------( 164      ( 06 Sep 2021 09:23 )             41.5     09-06    141  |  106  |  23  ----------------------------<  161<H>  4.1   |  28  |  0.92    Ca    8.1<L>      06 Sep 2021 09:23              Review of Systems	      Objective     Physical Examination    heart s1s2  lung dec BS  abd soft  head nc  on o2 support      Pertinent Lab findings & Imaging      Bong:  NO   Adequate UO     I&O's Detail    05 Sep 2021 07:01  -  06 Sep 2021 07:00  --------------------------------------------------------  IN:  Total IN: 0 mL    OUT:    Voided (mL): 1475 mL  Total OUT: 1475 mL    Total NET: -1475 mL      06 Sep 2021 07:01  -  07 Sep 2021 05:53  --------------------------------------------------------  IN:    Oral Fluid: 300 mL  Total IN: 300 mL    OUT:  Total OUT: 0 mL    Total NET: 300 mL               Discussed with:     Cultures:	        Radiology

## 2021-09-07 NOTE — PHARMACOTHERAPY INTERVENTION NOTE - COMMENTS
COVID-19 patient ordered for Lovenox 40 mg SQ q12h for VTE prophylaxis. Given patient's BMI< 30 and D-Dimer< 4 times ULN, s/w Dr. Gannon the recommended dose is Lovenox 40 mg SQ daily. Accepted and ordered.

## 2021-09-08 VITALS
DIASTOLIC BLOOD PRESSURE: 77 MMHG | OXYGEN SATURATION: 95 % | RESPIRATION RATE: 18 BRPM | HEART RATE: 81 BPM | SYSTOLIC BLOOD PRESSURE: 118 MMHG | TEMPERATURE: 98 F

## 2021-09-08 LAB
CULTURE RESULTS: SIGNIFICANT CHANGE UP
CULTURE RESULTS: SIGNIFICANT CHANGE UP
D DIMER BLD IA.RAPID-MCNC: 291 NG/ML DDU — HIGH
SPECIMEN SOURCE: SIGNIFICANT CHANGE UP
SPECIMEN SOURCE: SIGNIFICANT CHANGE UP

## 2021-09-08 PROCEDURE — 93005 ELECTROCARDIOGRAM TRACING: CPT

## 2021-09-08 PROCEDURE — 80048 BASIC METABOLIC PNL TOTAL CA: CPT

## 2021-09-08 PROCEDURE — 36415 COLL VENOUS BLD VENIPUNCTURE: CPT

## 2021-09-08 PROCEDURE — 85027 COMPLETE CBC AUTOMATED: CPT

## 2021-09-08 PROCEDURE — 83036 HEMOGLOBIN GLYCOSYLATED A1C: CPT

## 2021-09-08 PROCEDURE — 86140 C-REACTIVE PROTEIN: CPT

## 2021-09-08 PROCEDURE — 80053 COMPREHEN METABOLIC PANEL: CPT

## 2021-09-08 PROCEDURE — 85379 FIBRIN DEGRADATION QUANT: CPT

## 2021-09-08 PROCEDURE — 82728 ASSAY OF FERRITIN: CPT

## 2021-09-08 PROCEDURE — U0005: CPT

## 2021-09-08 PROCEDURE — 94640 AIRWAY INHALATION TREATMENT: CPT

## 2021-09-08 PROCEDURE — U0003: CPT

## 2021-09-08 PROCEDURE — 87040 BLOOD CULTURE FOR BACTERIA: CPT

## 2021-09-08 PROCEDURE — 85025 COMPLETE CBC W/AUTO DIFF WBC: CPT

## 2021-09-08 PROCEDURE — 99285 EMERGENCY DEPT VISIT HI MDM: CPT

## 2021-09-08 PROCEDURE — 71045 X-RAY EXAM CHEST 1 VIEW: CPT

## 2021-09-08 PROCEDURE — 86769 SARS-COV-2 COVID-19 ANTIBODY: CPT

## 2021-09-08 PROCEDURE — 99239 HOSP IP/OBS DSCHRG MGMT >30: CPT

## 2021-09-08 PROCEDURE — 84145 PROCALCITONIN (PCT): CPT

## 2021-09-08 PROCEDURE — 96374 THER/PROPH/DIAG INJ IV PUSH: CPT

## 2021-09-08 PROCEDURE — 83605 ASSAY OF LACTIC ACID: CPT

## 2021-09-08 RX ORDER — DEXAMETHASONE 0.5 MG/5ML
1 ELIXIR ORAL
Qty: 5 | Refills: 0
Start: 2021-09-08 | End: 2021-09-12

## 2021-09-08 RX ORDER — ALBUTEROL 90 UG/1
2 AEROSOL, METERED ORAL
Qty: 1 | Refills: 0
Start: 2021-09-08

## 2021-09-08 RX ORDER — ZINC SULFATE TAB 220 MG (50 MG ZINC EQUIVALENT) 220 (50 ZN) MG
1 TAB ORAL
Qty: 0 | Refills: 0 | DISCHARGE
Start: 2021-09-08

## 2021-09-08 RX ADMIN — Medication 6 MILLIGRAM(S): at 05:45

## 2021-09-08 RX ADMIN — Medication 5 MILLILITER(S): at 05:45

## 2021-09-08 RX ADMIN — ENOXAPARIN SODIUM 40 MILLIGRAM(S): 100 INJECTION SUBCUTANEOUS at 11:20

## 2021-09-08 RX ADMIN — Medication 5 MILLILITER(S): at 11:20

## 2021-09-08 RX ADMIN — Medication 1 TABLET(S): at 11:20

## 2021-09-08 RX ADMIN — Medication 500 MILLIGRAM(S): at 11:20

## 2021-09-08 RX ADMIN — ZINC SULFATE TAB 220 MG (50 MG ZINC EQUIVALENT) 220 MILLIGRAM(S): 220 (50 ZN) TAB at 11:20

## 2021-09-08 RX ADMIN — Medication 1000 UNIT(S): at 11:20

## 2021-09-08 NOTE — DISCHARGE NOTE PROVIDER - HOSPITAL COURSE
HPI:  41 year old M w no PMH BIBEMS sating 79% on RA and c/o shortness of breath, fever, chills, cough, headache. Patient has been self-quarantined since August 25 when his symptoms first began. Patient denies history of smoking and has not had a COVID vaccine. Patient is an  for [employer] and had recent travel to [place] and [facility]. Denies any sick contacts.    The date the pt first felt unwell: Aug 25    ED course:  Vitals: T 101F, HR 94, /68, RR 20, SpO2 99% NRB 15L  Labs: platelets 78, lymphocytes 0.38, D-dimer 420, , Ferritin 3186, Ca 7.7, Albumin 2.7, AST 90, Procal 1.93  EKG: NSR 86bpm, Nonspecific T wave abnormality  CXR: Scattered mid lower lung field infiltrates consistent with Covid Pneumonia  Given: Tylenol 650mg x1, Aspirin 325mg x1, Decadron 6mg IV x1, Lovenox 40mg subQ x1, Remdesivir 200mg IVPB x1, 1L IV NS (03 Sep 2021 13:52)      COURSE:   Patient was hospitalized for COVID PNA with acute respiratory failure. Seen in consultation by ID and Pulm. Treated with Remdesivir and Dexamethasone.  The hospital course of this patient was uncomplicated and the patient was discharged in stable medical condition.

## 2021-09-08 NOTE — PROGRESS NOTE ADULT - ASSESSMENT
41 year old M w no significant PMH admitted for COVID-19. 
pt with known Covid -   viral pna - hypoxemia - SOB - Cough - Weakness - Dec PO intake    completed Remdesivir  remains on Decadron  Fio2 titration in progress  VS noted  labs reviewed    remdesivir   decadron   o2 support  tylenol and robitussin  fio2 titration  keep sat > 88 pct  pronate as tolerated  isolation precs  serial D dimer  DVT p - Lovenox  cxr - c/w Viral PNA  consideration for CTA chest if D dimer increases on repeat testing  
41 year old M w no significant PMH admitted for COVID-19. 
pt with known Covid -   viral pna - hypoxemia - SOB - Cough - Weakness - Dec PO intake    Hgba1c noted  ID eval noted  remains on o2 support  decadron and remdesivir in action    remdesivir   decadron   o2 support  tylenol and robitussin  fio2 titration  keep sat > 88 pct  pronate as tolerated  isolation precs  serial D dimer  DVT p - Lovenox  cxr - c/w Viral PNA  consideration for CTA chest if D dimer increases on repeat testing
pt with known Covid -   viral pna - hypoxemia - SOB - Cough - Weakness - Dec PO intake    Hgba1c noted  ID eval noted  remains on o2 support  decadron and remdesivir in action    remdesivir   decadron   o2 support  tylenol and robitussin  fio2 titration  keep sat > 88 pct  pronate as tolerated  isolation precs  serial D dimer  DVT p - Lovenox  cxr - c/w Viral PNA  consideration for CTA chest if D dimer increases on repeat testing  
pt with known Covid -   viral pna - hypoxemia - SOB - Cough - Weakness - Dec PO intake    ID eval noted  remains on o2 support  decadron and remdesivir in action      remdesivir   decadron   o2 support  tylenol and robitussin  fio2 titration  keep sat > 88 pct  pronate as tolerated  isolation precs  serial D dimer  DVT p - Lovenox  cxr - c/w Viral PNA  consideration for CTA chest if D dimer increases on repeat testing
pt with known Covid -   viral pna - hypoxemia - SOB - Cough - Weakness - Dec PO intake    Hgba1c noted  ID eval noted  remains on o2 support  decadron and remdesivir in action      remdesivir   decadron   o2 support  tylenol and robitussin  fio2 titration  keep sat > 88 pct  pronate as tolerated  isolation precs  serial D dimer  DVT p - Lovenox  cxr - c/w Viral PNA  consideration for CTA chest if D dimer increases on repeat testing
41 year old M w no significant PMH admitted for COVID-19. 
41 year old M w no significant PMH admitted for COVID-19.

## 2021-09-08 NOTE — PROGRESS NOTE ADULT - PROVIDER SPECIALTY LIST ADULT
Internal Medicine
Infectious Disease
Internal Medicine
Pulmonology
Hospitalist
Internal Medicine

## 2021-09-08 NOTE — DISCHARGE NOTE PROVIDER - CARE PROVIDER_API CALL
PCP,   Please call and make appointment to see your PCP within 3-5 days of discharge  Phone: (   )    -  Fax: (   )    -  Follow Up Time:

## 2021-09-08 NOTE — DISCHARGE NOTE PROVIDER - NSDCPNSUBOBJ_GEN_ALL_CORE
MEDICAL ATTENDING DISCHARGE NOTE :    Patient is a 41y old  Male who presents with a chief complaint of COVID-19 (08 Sep 2021 05:55)      INTERVAL HPI / OVERNIGHT EVENTS: patient with uneventful night and offers no new complaints    ----------------------------------------------------------------------------------  REVIEW OF SYSTEMS: no chest pain; mostly resolved SOB and fever      Vital Signs Last 24 Hrs  T(C): 36.8 (08 Sep 2021 13:43), Max: 37.7 (07 Sep 2021 20:10)  T(F): 98.3 (08 Sep 2021 13:43), Max: 99.8 (07 Sep 2021 20:10)  HR: 81 (08 Sep 2021 13:43) (63 - 81)  BP: 118/77 (08 Sep 2021 13:43) (117/72 - 118/77)  BP(mean): --  RR: 18 (08 Sep 2021 13:43) (17 - 19)  SpO2: 95% (08 Sep 2021 13:43) (88% - 96%)    _________________  PHYSICAL EXAM:  ---------------------------   NAD; Normocephalic  LUNGS - no wheezing  HEART: S1 S2+   ABDOMEN: Soft, Nontender, non distended, BS+  EXTREMITIES: no cyanosis; no edema      _________________________________________________  LABS:                        14.0   9.26  )-----------( 177      ( 07 Sep 2021 09:16 )             42.6     09-07    140  |  105  |  23  ----------------------------<  134<H>  4.2   |  30  |  0.94    Ca    8.0<L>      07 Sep 2021 09:16            A/P: Patient was admitted for Viral pneumonia due to novel corona virus  - clinically stable for discharge.  - strongly advised to self quarantine at home for 10 days starting from today.  - Return to the ED if recurrence or progression of symptoms including fever and shortness of breath.  - Call PCP for post hospitalization follow up.      Plan of care, test results and findings were  discussed with patient  ; all questions and concerns were addressed .  PMD follow up after discharge from the hospital for continued care and outpatient monitoring was advised.  Discharge plans was  discussed with care team including the housestaff, nursing staff and discharge planners.

## 2021-09-08 NOTE — DISCHARGE NOTE PROVIDER - NSDCCPCAREPLAN_GEN_ALL_CORE_FT
PRINCIPAL DISCHARGE DIAGNOSIS  Diagnosis: 2019 novel coronavirus disease (COVID-19)  Assessment and Plan of Treatment: You were hospitalized for COVID-19 infection with acute respiratory failure. You were seen in consultation by infectious disease and Pulmonary doactors and treated with Remdesivir and Dexamethasone. Your symptoms have improved and resolving. Please follow up with your PCP upon discharge from the hospital for continued care and monitoring. Take your medications as prescribed.  Please continue to adhere to the COVID-19 pandemic isolation precaution requirements per public health health infection control recomendations.

## 2021-09-08 NOTE — PROGRESS NOTE ADULT - SUBJECTIVE AND OBJECTIVE BOX
Date/Time Patient Seen:  		  Referring MD:   Data Reviewed	       Patient is a 41y old  Male who presents with a chief complaint of COVID-19 (07 Sep 2021 17:10)      Subjective/HPI     PAST MEDICAL & SURGICAL HISTORY:  No pertinent past medical history    No significant past surgical history          Medication list         MEDICATIONS  (STANDING):  ALBUTerol    90 MICROgram(s) HFA Inhaler 2 Puff(s) Inhalation every 6 hours  ascorbic acid 500 milliGRAM(s) Oral daily  cholecalciferol 1000 Unit(s) Oral daily  dexAMETHasone     Tablet 6 milliGRAM(s) Oral daily  enoxaparin Injectable 40 milliGRAM(s) SubCutaneous daily  guaifenesin/dextromethorphan Oral Liquid 5 milliLiter(s) Oral every 6 hours  multivitamin 1 Tablet(s) Oral daily  zinc sulfate 220 milliGRAM(s) Oral daily    MEDICATIONS  (PRN):  acetaminophen   Tablet .. 650 milliGRAM(s) Oral every 6 hours PRN Temp greater or equal to 38C (100.4F), Mild Pain (1 - 3)  acetaminophen   Tablet .. 650 milliGRAM(s) Oral every 4 hours PRN Temp greater or equal to 38C (100.4F)  acetaminophen  Suppository .. 650 milliGRAM(s) Rectal every 4 hours PRN Temp greater or equal to 38C (100.4F)  benzonatate 100 milliGRAM(s) Oral every 8 hours PRN Cough  polyethylene glycol 3350 17 Gram(s) Oral daily PRN Constipation  senna 2 Tablet(s) Oral at bedtime PRN Constipation         Vitals log        ICU Vital Signs Last 24 Hrs  T(C): 36.8 (08 Sep 2021 05:18), Max: 37.7 (07 Sep 2021 20:10)  T(F): 98.3 (08 Sep 2021 05:18), Max: 99.8 (07 Sep 2021 20:10)  HR: 63 (08 Sep 2021 05:18) (63 - 69)  BP: 117/77 (08 Sep 2021 05:18) (117/72 - 123/78)  BP(mean): --  ABP: --  ABP(mean): --  RR: 18 (08 Sep 2021 05:18) (18 - 19)  SpO2: 96% (08 Sep 2021 05:18) (95% - 97%)           Input and Output:  I&O's Detail    06 Sep 2021 07:01  -  07 Sep 2021 07:00  --------------------------------------------------------  IN:    Oral Fluid: 300 mL  Total IN: 300 mL    OUT:  Total OUT: 0 mL    Total NET: 300 mL      07 Sep 2021 07:01  -  08 Sep 2021 05:56  --------------------------------------------------------  IN:    Oral Fluid: 300 mL  Total IN: 300 mL    OUT:  Total OUT: 0 mL    Total NET: 300 mL          Lab Data                        14.0   9.26  )-----------( 177      ( 07 Sep 2021 09:16 )             42.6     09-07    140  |  105  |  23  ----------------------------<  134<H>  4.2   |  30  |  0.94    Ca    8.0<L>      07 Sep 2021 09:16              Review of Systems	      Objective     Physical Examination    heart s1s2  lung dec BS  abd soft  head nc  on o2 support      Pertinent Lab findings & Imaging      Bong:  NO   Adequate UO     I&O's Detail    06 Sep 2021 07:01  -  07 Sep 2021 07:00  --------------------------------------------------------  IN:    Oral Fluid: 300 mL  Total IN: 300 mL    OUT:  Total OUT: 0 mL    Total NET: 300 mL      07 Sep 2021 07:01  -  08 Sep 2021 05:56  --------------------------------------------------------  IN:    Oral Fluid: 300 mL  Total IN: 300 mL    OUT:  Total OUT: 0 mL    Total NET: 300 mL               Discussed with:     Cultures:	        Radiology

## 2021-09-08 NOTE — DISCHARGE NOTE NURSING/CASE MANAGEMENT/SOCIAL WORK - PATIENT PORTAL LINK FT
You can access the FollowMyHealth Patient Portal offered by [facility] by registering at the following website: [email or web address]. By joining [email or web address]’s FollowMyHealth portal, you will also be able to view your health information using other applications (apps) compatible with our system.

## 2021-09-08 NOTE — DISCHARGE NOTE PROVIDER - PROVIDER TOKENS
FREE:[LAST:[PCP],PHONE:[(   )    -],FAX:[(   )    -],ADDRESS:[Please call and make appointment to see your PCP within 3-5 days of discharge]]

## 2021-09-08 NOTE — PROGRESS NOTE ADULT - REASON FOR ADMISSION
COVID-19

## 2021-09-08 NOTE — DISCHARGE NOTE PROVIDER - NSDCMRMEDTOKEN_GEN_ALL_CORE_FT
Vitamin C 500 mg oral tablet: 1 tab(s) orally once a day  Vitamin D3 25 mcg (1000 intl units) oral tablet: 1 tab(s) orally once a day   Vitamin C 500 mg oral tablet: 1 tab(s) orally once a day  Vitamin D3 25 mcg (1000 intl units) oral tablet: 1 tab(s) orally once a day  zinc sulfate 220 mg oral capsule: 1 cap(s) orally once a day

## 2021-12-09 PROBLEM — Z78.9 OTHER SPECIFIED HEALTH STATUS: Chronic | Status: ACTIVE | Noted: 2021-08-31

## 2021-12-09 RX ORDER — CHOLECALCIFEROL (VITAMIN D3) 125 MCG
1 CAPSULE ORAL
Qty: 0 | Refills: 0 | DISCHARGE

## 2021-12-09 RX ORDER — ASCORBIC ACID 60 MG
1 TABLET,CHEWABLE ORAL
Qty: 0 | Refills: 0 | DISCHARGE

## 2024-01-30 NOTE — ED ADULT NURSE NOTE - NSFALLRSKASSESSTYPE_ED_ALL_ED
----- Message from Freda Villasenor MD sent at 1/29/2024  5:29 PM CST -----  Urine looks good, the cholesterol is a little high but with her reaction to statin in past we'll not start it. The anemia is better, the sugar is doing good and so is the thyroid. The liver and kidney look good.   
Result given to patient  voices understanding.  
Initial (On Arrival)

## 2024-05-13 NOTE — PATIENT PROFILE ADULT - NSPRONUTRITIONRISK_GEN_A_NUR
Pt is largely w/c bound at AL, receives PT twice a week and ambulates with RW and assistance during sessions. No indicators present

## 2025-03-05 NOTE — ED ADULT NURSE NOTE - NS_ED_NURSE_TEACHING_TOPIC_ED_A_ED
[FreeTextEntry1] : EKG 1/1/2025  Ventricular Rate 93 BPM  Atrial Rate 93 BPM  P-R Interval 162 ms  QRS Duration 82 ms  Q-T Interval 358 ms  QTC Calculation(Bazett) 445 ms  P Axis 88 degrees  R Axis 43 degrees  T Axis 77 degrees  Diagnosis Line Sinus rhythm with Premature atrial complexes Nonspecific ST abnormality Abnormal ECG
COVID 19/Respiratory/Medications